# Patient Record
Sex: FEMALE | Race: OTHER | HISPANIC OR LATINO | URBAN - METROPOLITAN AREA
[De-identification: names, ages, dates, MRNs, and addresses within clinical notes are randomized per-mention and may not be internally consistent; named-entity substitution may affect disease eponyms.]

---

## 2018-11-17 ENCOUNTER — EMERGENCY (EMERGENCY)
Facility: HOSPITAL | Age: 63
LOS: 1 days | Discharge: HOME | End: 2018-11-17
Attending: EMERGENCY MEDICINE
Payer: COMMERCIAL

## 2018-11-17 VITALS
SYSTOLIC BLOOD PRESSURE: 138 MMHG | RESPIRATION RATE: 19 BRPM | DIASTOLIC BLOOD PRESSURE: 95 MMHG | TEMPERATURE: 98 F | HEIGHT: 64 IN | WEIGHT: 147.93 LBS | HEART RATE: 98 BPM | OXYGEN SATURATION: 99 %

## 2018-11-17 VITALS
RESPIRATION RATE: 18 BRPM | OXYGEN SATURATION: 99 % | HEART RATE: 78 BPM | SYSTOLIC BLOOD PRESSURE: 122 MMHG | TEMPERATURE: 98 F | DIASTOLIC BLOOD PRESSURE: 85 MMHG

## 2018-11-17 DIAGNOSIS — Z98.890 OTHER SPECIFIED POSTPROCEDURAL STATES: Chronic | ICD-10-CM

## 2018-11-17 PROCEDURE — 72131 CT LUMBAR SPINE W/O DYE: CPT | Mod: 26

## 2018-11-17 PROCEDURE — 70450 CT HEAD/BRAIN W/O DYE: CPT

## 2018-11-17 PROCEDURE — 73110 X-RAY EXAM OF WRIST: CPT

## 2018-11-17 PROCEDURE — 29125 APPL SHORT ARM SPLINT STATIC: CPT | Mod: LT

## 2018-11-17 PROCEDURE — 73100 X-RAY EXAM OF WRIST: CPT

## 2018-11-17 PROCEDURE — 99284 EMERGENCY DEPT VISIT MOD MDM: CPT | Mod: 25

## 2018-11-17 PROCEDURE — 96372 THER/PROPH/DIAG INJ SC/IM: CPT | Mod: XU

## 2018-11-17 PROCEDURE — 99284 EMERGENCY DEPT VISIT MOD MDM: CPT

## 2018-11-17 PROCEDURE — 72131 CT LUMBAR SPINE W/O DYE: CPT

## 2018-11-17 PROCEDURE — 72100 X-RAY EXAM L-S SPINE 2/3 VWS: CPT

## 2018-11-17 PROCEDURE — 73100 X-RAY EXAM OF WRIST: CPT | Mod: 26,59,LT

## 2018-11-17 PROCEDURE — 93005 ELECTROCARDIOGRAM TRACING: CPT

## 2018-11-17 PROCEDURE — 73110 X-RAY EXAM OF WRIST: CPT | Mod: 26,LT

## 2018-11-17 PROCEDURE — 72125 CT NECK SPINE W/O DYE: CPT | Mod: 26

## 2018-11-17 PROCEDURE — 72125 CT NECK SPINE W/O DYE: CPT

## 2018-11-17 PROCEDURE — 70450 CT HEAD/BRAIN W/O DYE: CPT | Mod: 26

## 2018-11-17 PROCEDURE — 72100 X-RAY EXAM L-S SPINE 2/3 VWS: CPT | Mod: 26

## 2018-11-17 RX ORDER — LIDOCAINE 4 G/100G
1 CREAM TOPICAL ONCE
Qty: 0 | Refills: 0 | Status: COMPLETED | OUTPATIENT
Start: 2018-11-17 | End: 2018-11-17

## 2018-11-17 RX ORDER — CYCLOBENZAPRINE HYDROCHLORIDE 10 MG/1
10 TABLET, FILM COATED ORAL ONCE
Qty: 0 | Refills: 0 | Status: COMPLETED | OUTPATIENT
Start: 2018-11-17 | End: 2018-11-17

## 2018-11-17 RX ORDER — ONDANSETRON 8 MG/1
4 TABLET, FILM COATED ORAL ONCE
Qty: 0 | Refills: 0 | Status: COMPLETED | OUTPATIENT
Start: 2018-11-17 | End: 2018-11-17

## 2018-11-17 RX ORDER — KETOROLAC TROMETHAMINE 30 MG/ML
30 SYRINGE (ML) INJECTION ONCE
Qty: 0 | Refills: 0 | Status: DISCONTINUED | OUTPATIENT
Start: 2018-11-17 | End: 2018-11-17

## 2018-11-17 RX ORDER — SODIUM CHLORIDE 9 MG/ML
1000 INJECTION INTRAMUSCULAR; INTRAVENOUS; SUBCUTANEOUS ONCE
Qty: 0 | Refills: 0 | Status: COMPLETED | OUTPATIENT
Start: 2018-11-17 | End: 2018-11-17

## 2018-11-17 RX ADMIN — LIDOCAINE 1 PATCH: 4 CREAM TOPICAL at 11:38

## 2018-11-17 RX ADMIN — Medication 30 MILLIGRAM(S): at 12:31

## 2018-11-17 RX ADMIN — Medication 30 MILLIGRAM(S): at 11:38

## 2018-11-17 RX ADMIN — SODIUM CHLORIDE 1000 MILLILITER(S): 9 INJECTION INTRAMUSCULAR; INTRAVENOUS; SUBCUTANEOUS at 12:32

## 2018-11-17 RX ADMIN — ONDANSETRON 4 MILLIGRAM(S): 8 TABLET, FILM COATED ORAL at 11:38

## 2018-11-17 RX ADMIN — CYCLOBENZAPRINE HYDROCHLORIDE 10 MILLIGRAM(S): 10 TABLET, FILM COATED ORAL at 12:31

## 2018-11-17 RX ADMIN — SODIUM CHLORIDE 1000 MILLILITER(S): 9 INJECTION INTRAMUSCULAR; INTRAVENOUS; SUBCUTANEOUS at 13:35

## 2018-11-17 NOTE — CONSULT NOTE ADULT - SUBJECTIVE AND OBJECTIVE BOX
Patient is a 63F RHD community ambulator without assistive devices who presents to the ED today for a c/o of L wrist pain and lower back pain. 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 She states she placed her L wrist outwards to break her fall and then landed on her back. Denies HH/LOC. States she was able to ambulate after the injury but with a lot of pain. Denies any saddle anesthesia or loss of bowel or bladder function. Denies having any other pain elsewhere. Admits to L Shoulder dislocation about 12 years ago that was closed reduced at [facility]. Denies any other orthopedic history. No other orthopedic concerns at this time.     PAST MEDICAL & SURGICAL HISTORY:  History of stroke: at 15 years old  History of shoulder surgery    Home Medications:  Denies    Allergies    No Known Allergies    Intolerances    PHYSICAL EXAM:  Vital Signs Last 24 Hrs  T(C): 36.7 (17 Nov 2018 11:16), Max: 36.7 (17 Nov 2018 11:16)  T(F): 98 (17 Nov 2018 11:16), Max: 98 (17 Nov 2018 11:16)  HR: 98 (17 Nov 2018 11:16) (98 - 98)  BP: 138/95 (17 Nov 2018 11:16) (138/95 - 138/95)  RR: 19 (17 Nov 2018 11:16) (19 - 19)  SpO2: 99% (17 Nov 2018 11:16) (99% - 99%)    LUE:  No gross deformity noted  Skin intact; No erythema or ecchymosis  SILT C5-T1  +AIN/PIN/Median/Ulnar/Radial Nerve  +ttp over radial styloid   No snuffbox tenderness  +2/4 Radial pulse  Compartments soft and compressible    Spine:         +ttp b/l paraspinal musculature         +ttp midline lumbar spine L1-2         Sensation:  intact to light touch           Motor exam:          Bilateral Upper extremities    Delt      Bicep      Tri      Wrist ext  Wrst Flex       Digit Ext Digit Flex                                                   R         5/5        5/5        5/5       5/5            5/5            5/5       5/5          5/5                                                   L          5/5        5/5        5/5       5/5            5/5            5/5       5/5          5/5         Bilateral Lower ext.     Hip Flx  Hip Ext    Quad   Hamstrg   TA       EHL      GS                                          R        5/5        5/5        5/5        5/5          5/5     5/5      5/5                                          L         5/5 5/5 5/5 5/5 5/5 5/5 5/5    Babinski:   Ankle Clonus:  Hoffmans:     Secondary Survey:  RLE/LLE/RUE: No TTP over bony prominences, SILT, palpable pulses, full/painless range of motion, compartments soft Patient is a 63F RHD community ambulator without assistive devices who presents to the ED today for a c/o of L wrist pain and lower back pain. 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 She states she placed her L wrist outwards to break her fall and then landed on her back. Denies HH/LOC. States she was able to ambulate after the injury but with a lot of pain. Denies any saddle anesthesia or loss of bowel or bladder function. Denies having any other pain elsewhere. Admits to L Shoulder dislocation about 12 years ago that was closed reduced at [facility]. Denies any other orthopedic history. No other orthopedic concerns at this time.     PAST MEDICAL & SURGICAL HISTORY:  History of stroke: at 15 years old  History of shoulder surgery    Home Medications:  Denies    Allergies    No Known Allergies    Intolerances    PHYSICAL EXAM:  Vital Signs Last 24 Hrs  T(C): 36.7 (17 Nov 2018 11:16), Max: 36.7 (17 Nov 2018 11:16)  T(F): 98 (17 Nov 2018 11:16), Max: 98 (17 Nov 2018 11:16)  HR: 98 (17 Nov 2018 11:16) (98 - 98)  BP: 138/95 (17 Nov 2018 11:16) (138/95 - 138/95)  RR: 19 (17 Nov 2018 11:16) (19 - 19)  SpO2: 99% (17 Nov 2018 11:16) (99% - 99%)    LUE:  No gross deformity noted  Skin intact; No erythema or ecchymosis  SILT C5-T1  +AIN/PIN/Median/Ulnar/Radial Nerve  +ttp over radial styloid   + snuffbox tenderness  +2/4 Radial pulse  Compartments soft and compressible    Spine:         +ttp b/l paraspinal musculature         +ttp midline lumbar spine L1-2         Sensation:  intact to light touch           Motor exam:          Bilateral Upper extremities    	Delt      Bicep      Tri      Wrist ext  Wrst Flex       Digit Ext Digit Flex                                                   R         5/5        5/5        5/5       5/5            5/5            5/5       5/5          5/5                                                   L          5/5        5/5       5/5        DONN 2/2 to Pain           5/5       5/5          5/5         Bilateral Lower ext.     Hip Flx  Hip Ext    Quad   Hamstrg   TA       EHL      GS                                          R        5/5        5/5        5/5        5/5          5/5     5/5      5/5                                          L         5/5 5/5 5/5 5/5 5/5 5/5 5/5    Babinski: Neg  Ankle Clonus: Neg  Hoffmans: Neg    Secondary Survey:  RLE/LLE/RUE: No TTP over bony prominences, SILT, palpable pulses, full/painless range of motion, compartments soft Patient is a 63F RHD community ambulator without assistive devices who presents to the ED today for a c/o of L wrist pain and lower back pain. 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 She states she placed her L wrist outwards to break her fall and then landed on her back. Denies HH/LOC. States she was able to ambulate after the injury but with a lot of pain. Denies any saddle anesthesia or loss of bowel or bladder function. Denies having any other pain elsewhere. Admits to L Shoulder dislocation about 12 years ago that was closed reduced at [facility]. Denies any other orthopedic history. No other orthopedic concerns at this time.     PAST MEDICAL & SURGICAL HISTORY:  History of stroke: at 15 years old  History of shoulder surgery    Home Medications:  Denies    Allergies    No Known Allergies    Intolerances    PHYSICAL EXAM:  Vital Signs Last 24 Hrs  T(C): 36.7 (17 Nov 2018 11:16), Max: 36.7 (17 Nov 2018 11:16)  T(F): 98 (17 Nov 2018 11:16), Max: 98 (17 Nov 2018 11:16)  HR: 98 (17 Nov 2018 11:16) (98 - 98)  BP: 138/95 (17 Nov 2018 11:16) (138/95 - 138/95)  RR: 19 (17 Nov 2018 11:16) (19 - 19)  SpO2: 99% (17 Nov 2018 11:16) (99% - 99%)    LUE:  No gross deformity noted  Skin intact; No erythema or ecchymosis  SILT C5-T1  +AIN/PIN/Median/Ulnar/Radial Nerve  +ttp over radial styloid   + snuffbox tenderness  +2/4 Radial pulse  Compartments soft and compressible    Spine:         +ttp b/l paraspinal musculature         +ttp midline lumbar spine L1-2         Sensation:  intact to light touch           Motor exam:          Bilateral Upper extremities    	Delt      Bicep      Tri      Wrist ext  Wrst Flex       Digit Ext Digit Flex                                                   R         5/5        5/5        5/5       5/5            5/5            5/5       5/5          5/5                                                   L          5/5        5/5       5/5        DONN 2/2 to Pain           5/5       5/5          5/5         Bilateral Lower ext.     Hip Flx  Hip Ext    Quad   Hamstrg   TA       EHL      GS                                          R        5/5        5/5        5/5        5/5          5/5     5/5      5/5                                          L         5/5        5/5        5/5        5/5          5/5     5/5      5/5    Babinski: Neg  Ankle Clonus: Neg  Hoffmans: Neg    Secondary Survey:  RLE/LLE/RUE: No TTP over bony prominences, SILT, palpable pulses, full/painless range of motion, compartments soft     Procedure: Patient was placed into a sugartong/thumb spica splint ensuring all bony prominences were well padded. Post reduction xrays were obtained showed adequate anatomic alignment. Patient was NVI preprocedure and postprocedure. Patient tolerated procedure well without any complications.

## 2018-11-17 NOTE — ED PROVIDER NOTE - CARE PLAN
Principal Discharge DX:	Wrist pain, left  Secondary Diagnosis:	Back pain  Secondary Diagnosis:	Fall, initial encounter

## 2018-11-17 NOTE — ED PROVIDER NOTE - PROGRESS NOTE DETAILS
spoke with [facility] resident, case discussed, will see patient ortho resident in to see patient, requested dose of flexeril for patient

## 2018-11-17 NOTE — ED PROVIDER NOTE - ATTENDING CONTRIBUTION TO CARE
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 Exam revealed white female in mild distress with tenderness to palpation dorsum left wrist with closed deformity but intact N/V LUE. Also mild tenderness to palpation lower lumbar area. I agree with plan and management outlined by PA.

## 2018-11-17 NOTE — CONSULT NOTE ADULT - ASSESSMENT
A/P: 63F s/p MF w/ L Radial Styloid Fx and L1 VCF  Analgesia  Muscle relaxant  WBAT  L wrist placed into sugartong splint  Keep in sling for comfort  Recommend TLSO brace  PT/OT  FU outpatient with Dr. Jeffers within 1 week  No acute orthopedic surgical intervention indicated at this time  Ortho stable  Will discuss with attending and advise if plan changes A/P: 63F s/p MF w/ L Radial Styloid Fx and L1 VCF  Analgesia  Muscle relaxant  WBAT B/L Lower extremities  NWB LUE  L wrist placed into sugartong splint w/ thumb spica  Keep in sling for comfort  Recommend TLSO brace  PT/OT  FU outpatient with Dr. Jeffers within 1 week  Repeat imaging of wrist needed in 1 week to r/o occult scaphoid fx given snuff box tenderness; MR may be needed if repeat xrays are negative and patient continues to have pain  No acute orthopedic surgical intervention indicated at this time  Ortho stable  Will discuss with attending and advise if plan changes

## 2018-11-17 NOTE — ED PROVIDER NOTE - OBJECTIVE STATEMENT
63 y female presents with s/p slip and fall at work today states she fell off back of truck , fell backwards, tried to stop her fall with her left arm, injured her left wrist, denies head injury, states she thinks she had episode of loc for a "second",  has lower back pain, feels nausea, and dizzy.  states at 15 yo, she was might have had a "stroke", no residual weakness, no episodes since 15 yo.  denies any other pmh, no meds,  PMD at [facility].  nonsmoker, no etoh.    no chest pain, no sob, no abdominal pain. 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  states at 15 yo, she was might have had a "stroke", no residual weakness, no episodes since 15 yo.  denies any other pmh, no meds,  PMD at [facility].  nonsmoker, no etoh.    no chest pain, no sob, no abdominal pain.

## 2018-11-17 NOTE — ED ADULT NURSE NOTE - OBJECTIVE STATEMENT
Pt. received alert and oriented x4 with chief complaint of "losing balance" while on a truck at work and falling off hitting lower back, left wrist and back of head. Pt. states she "blacked out" and continues to have dizziness and nausea.

## 2018-12-20 ENCOUNTER — OUTPATIENT (OUTPATIENT)
Dept: OUTPATIENT SERVICES | Facility: HOSPITAL | Age: 63
LOS: 1 days | Discharge: HOME | End: 2018-12-20
Payer: OTHER MISCELLANEOUS

## 2018-12-20 VITALS
RESPIRATION RATE: 20 BRPM | HEIGHT: 64 IN | OXYGEN SATURATION: 99 % | DIASTOLIC BLOOD PRESSURE: 84 MMHG | SYSTOLIC BLOOD PRESSURE: 145 MMHG | WEIGHT: 154.1 LBS | HEART RATE: 74 BPM | TEMPERATURE: 98 F

## 2018-12-20 DIAGNOSIS — Z98.890 OTHER SPECIFIED POSTPROCEDURAL STATES: Chronic | ICD-10-CM

## 2018-12-20 DIAGNOSIS — M54.5 LOW BACK PAIN: ICD-10-CM

## 2018-12-20 DIAGNOSIS — Z01.818 ENCOUNTER FOR OTHER PREPROCEDURAL EXAMINATION: ICD-10-CM

## 2018-12-20 LAB
ABO RH CONFIRMATION: SIGNIFICANT CHANGE UP
ANION GAP SERPL CALC-SCNC: 7 MMOL/L — SIGNIFICANT CHANGE UP (ref 5–17)
APPEARANCE UR: CLEAR — SIGNIFICANT CHANGE UP
APTT BLD: 32.8 SEC — SIGNIFICANT CHANGE UP (ref 27.5–36.3)
BACTERIA # UR AUTO: ABNORMAL
BASOPHILS # BLD AUTO: 0.03 K/UL — SIGNIFICANT CHANGE UP (ref 0–0.2)
BASOPHILS NFR BLD AUTO: 0.5 % — SIGNIFICANT CHANGE UP (ref 0–2)
BILIRUB UR-MCNC: NEGATIVE — SIGNIFICANT CHANGE UP
BLD GP AB SCN SERPL QL: SIGNIFICANT CHANGE UP
BUN SERPL-MCNC: 19 MG/DL — SIGNIFICANT CHANGE UP (ref 7–23)
CALCIUM SERPL-MCNC: 10.1 MG/DL — SIGNIFICANT CHANGE UP (ref 8.5–10.1)
CHLORIDE SERPL-SCNC: 104 MMOL/L — SIGNIFICANT CHANGE UP (ref 96–108)
CO2 SERPL-SCNC: 28 MMOL/L — SIGNIFICANT CHANGE UP (ref 22–31)
COLOR SPEC: YELLOW — SIGNIFICANT CHANGE UP
CREAT SERPL-MCNC: 0.91 MG/DL — SIGNIFICANT CHANGE UP (ref 0.5–1.3)
DIFF PNL FLD: NEGATIVE — SIGNIFICANT CHANGE UP
EOSINOPHIL # BLD AUTO: 0.09 K/UL — SIGNIFICANT CHANGE UP (ref 0–0.5)
EOSINOPHIL NFR BLD AUTO: 1.6 % — SIGNIFICANT CHANGE UP (ref 0–6)
EPI CELLS # UR: NEGATIVE — SIGNIFICANT CHANGE UP
GLUCOSE SERPL-MCNC: 89 MG/DL — SIGNIFICANT CHANGE UP (ref 70–99)
GLUCOSE UR QL: NEGATIVE MG/DL — SIGNIFICANT CHANGE UP
HCT VFR BLD CALC: 45 % — SIGNIFICANT CHANGE UP (ref 34.5–45)
HGB BLD-MCNC: 14.6 G/DL — SIGNIFICANT CHANGE UP (ref 11.5–15.5)
IMM GRANULOCYTES NFR BLD AUTO: 0.2 % — SIGNIFICANT CHANGE UP (ref 0–1.5)
INR BLD: 1.04 RATIO — SIGNIFICANT CHANGE UP (ref 0.88–1.16)
KETONES UR-MCNC: NEGATIVE — SIGNIFICANT CHANGE UP
LEUKOCYTE ESTERASE UR-ACNC: ABNORMAL
LYMPHOCYTES # BLD AUTO: 1.84 K/UL — SIGNIFICANT CHANGE UP (ref 1–3.3)
LYMPHOCYTES # BLD AUTO: 33.3 % — SIGNIFICANT CHANGE UP (ref 13–44)
MCHC RBC-ENTMCNC: 28 PG — SIGNIFICANT CHANGE UP (ref 27–34)
MCHC RBC-ENTMCNC: 32.4 GM/DL — SIGNIFICANT CHANGE UP (ref 32–36)
MCV RBC AUTO: 86.4 FL — SIGNIFICANT CHANGE UP (ref 80–100)
MONOCYTES # BLD AUTO: 0.3 K/UL — SIGNIFICANT CHANGE UP (ref 0–0.9)
MONOCYTES NFR BLD AUTO: 5.4 % — SIGNIFICANT CHANGE UP (ref 2–14)
NEUTROPHILS # BLD AUTO: 3.25 K/UL — SIGNIFICANT CHANGE UP (ref 1.8–7.4)
NEUTROPHILS NFR BLD AUTO: 59 % — SIGNIFICANT CHANGE UP (ref 43–77)
NITRITE UR-MCNC: NEGATIVE — SIGNIFICANT CHANGE UP
NRBC # BLD: 0 /100 WBCS — SIGNIFICANT CHANGE UP (ref 0–0)
PH UR: 5 — SIGNIFICANT CHANGE UP (ref 5–8)
PLATELET # BLD AUTO: 313 K/UL — SIGNIFICANT CHANGE UP (ref 150–400)
POTASSIUM SERPL-MCNC: 4.2 MMOL/L — SIGNIFICANT CHANGE UP (ref 3.5–5.3)
POTASSIUM SERPL-SCNC: 4.2 MMOL/L — SIGNIFICANT CHANGE UP (ref 3.5–5.3)
PROT UR-MCNC: NEGATIVE MG/DL — SIGNIFICANT CHANGE UP
PROTHROM AB SERPL-ACNC: 11.6 SEC — SIGNIFICANT CHANGE UP (ref 10–12.9)
RBC # BLD: 5.21 M/UL — HIGH (ref 3.8–5.2)
RBC # FLD: 13.2 % — SIGNIFICANT CHANGE UP (ref 10.3–14.5)
RBC CASTS # UR COMP ASSIST: NEGATIVE /HPF — SIGNIFICANT CHANGE UP (ref 0–4)
SODIUM SERPL-SCNC: 139 MMOL/L — SIGNIFICANT CHANGE UP (ref 135–145)
SP GR SPEC: 1.01 — SIGNIFICANT CHANGE UP (ref 1.01–1.02)
TYPE + AB SCN PNL BLD: SIGNIFICANT CHANGE UP
UROBILINOGEN FLD QL: NEGATIVE MG/DL — SIGNIFICANT CHANGE UP
WBC # BLD: 5.52 K/UL — SIGNIFICANT CHANGE UP (ref 3.8–10.5)
WBC # FLD AUTO: 5.52 K/UL — SIGNIFICANT CHANGE UP (ref 3.8–10.5)
WBC UR QL: SIGNIFICANT CHANGE UP

## 2018-12-20 PROCEDURE — 71046 X-RAY EXAM CHEST 2 VIEWS: CPT | Mod: 26

## 2018-12-20 NOTE — H&P PST ADULT - ASSESSMENT
64 y/o female with spinal lumbar compression fracture. Scheduled for kyphoplasty.     Plan  1. Stop all NSAIDS, herbal supplements and vitamins for 7 days.  2. NPO at midnight.  3. Use EZ sponges as directed  4. Use mupirocin as directed  6. Labs done today. EKG, CXR on chart  7. Abnormal EKG noted. PMD visit for EKG comparison prior to surgery as per surgeon. 64 y/o female with spinal lumbar compression fracture. Scheduled for kyphoplasty.     Plan  1. Stop all NSAIDS, herbal supplements and vitamins for 7 days.  2. NPO at midnight.  3. Use EZ sponges as directed  4. Use mupirocin as directed  6. Labs, CXR done today. EKG on chart  7. Abnormal EKG noted. PMD visit for EKG comparison prior to surgery.

## 2018-12-20 NOTE — H&P PST ADULT - NSANTHOSAYNRD_GEN_A_CORE
No. SHABBIR screening performed.  STOP BANG Legend: 0-2 = LOW Risk; 3-4 = INTERMEDIATE Risk; 5-8 = HIGH Risk

## 2018-12-20 NOTE — H&P PST ADULT - HISTORY OF PRESENT ILLNESS
62 y/o female with spinal lumbar compression fracture, left radial styloid fracture. Pt reports she had an accident at work on November 17, 2018. Pt was taken to ER in [facility], placed on body brace. Pt poor historian. Complain of back pain since the accident, 8/10. Takes Tylenol with mild pain relief. Pt has difficulty walking and doing anything due to back pain. Scheduled for kyphoplasty. 62 y/o female with spinal lumbar compression fracture, left radial styloid fracture. Pt reports she had an accident at work on November 17, 2018. Pt was taken to ER in [facility], placed on TLSO brace. Pt poor historian. Complain of back pain since the accident, 8/10. Takes Tylenol with mild pain relief. Pt has difficulty doing anything due to back pain. Scheduled for kyphoplasty.

## 2018-12-20 NOTE — H&P PST ADULT - PMH
Compression fracture of lumbar vertebra    History of stroke  at 15 years old  Radial styloid fracture  left

## 2018-12-20 NOTE — H&P PST ADULT - PSH
History of bladder surgery  bladder lift 2000  History of shoulder surgery  left rotator cuff repair 2005

## 2018-12-21 LAB
MRSA PCR RESULT.: SIGNIFICANT CHANGE UP
S AUREUS DNA NOSE QL NAA+PROBE: DETECTED

## 2018-12-28 PROBLEM — S52.513A: Chronic | Status: ACTIVE | Noted: 2018-12-20

## 2018-12-28 PROBLEM — S32.000A WEDGE COMPRESSION FRACTURE OF UNSPECIFIED LUMBAR VERTEBRA, INITIAL ENCOUNTER FOR CLOSED FRACTURE: Chronic | Status: ACTIVE | Noted: 2018-12-20

## 2019-01-08 ENCOUNTER — APPOINTMENT (OUTPATIENT)
Dept: CARDIOLOGY | Facility: CLINIC | Age: 64
End: 2019-01-08

## 2019-03-15 ENCOUNTER — OUTPATIENT (OUTPATIENT)
Dept: OUTPATIENT SERVICES | Facility: HOSPITAL | Age: 64
LOS: 1 days | Discharge: HOME | End: 2019-03-15

## 2019-03-15 VITALS
SYSTOLIC BLOOD PRESSURE: 127 MMHG | DIASTOLIC BLOOD PRESSURE: 79 MMHG | TEMPERATURE: 100 F | OXYGEN SATURATION: 97 % | WEIGHT: 162.48 LBS | RESPIRATION RATE: 16 BRPM | HEART RATE: 82 BPM | HEIGHT: 64 IN

## 2019-03-15 DIAGNOSIS — Z01.818 ENCOUNTER FOR OTHER PREPROCEDURAL EXAMINATION: ICD-10-CM

## 2019-03-15 DIAGNOSIS — S32.010A WEDGE COMPRESSION FRACTURE OF FIRST LUMBAR VERTEBRA, INITIAL ENCOUNTER FOR CLOSED FRACTURE: ICD-10-CM

## 2019-03-15 DIAGNOSIS — Z98.890 OTHER SPECIFIED POSTPROCEDURAL STATES: Chronic | ICD-10-CM

## 2019-03-15 DIAGNOSIS — Z01.812 ENCOUNTER FOR PREPROCEDURAL LABORATORY EXAMINATION: ICD-10-CM

## 2019-03-15 DIAGNOSIS — S32.010D WEDGE COMPRESSION FRACTURE OF FIRST LUMBAR VERTEBRA, SUBSEQUENT ENCOUNTER FOR FRACTURE WITH ROUTINE HEALING: ICD-10-CM

## 2019-03-15 LAB
ANION GAP SERPL CALC-SCNC: 9 MMOL/L — SIGNIFICANT CHANGE UP (ref 5–17)
APTT BLD: 32.8 SEC — SIGNIFICANT CHANGE UP (ref 27.5–36.3)
BUN SERPL-MCNC: 16 MG/DL — SIGNIFICANT CHANGE UP (ref 7–23)
CALCIUM SERPL-MCNC: 9 MG/DL — SIGNIFICANT CHANGE UP (ref 8.5–10.1)
CHLORIDE SERPL-SCNC: 105 MMOL/L — SIGNIFICANT CHANGE UP (ref 96–108)
CO2 SERPL-SCNC: 26 MMOL/L — SIGNIFICANT CHANGE UP (ref 22–31)
CREAT SERPL-MCNC: 0.78 MG/DL — SIGNIFICANT CHANGE UP (ref 0.5–1.3)
GLUCOSE SERPL-MCNC: 85 MG/DL — SIGNIFICANT CHANGE UP (ref 70–99)
HCT VFR BLD CALC: 43.4 % — SIGNIFICANT CHANGE UP (ref 34.5–45)
HGB BLD-MCNC: 14 G/DL — SIGNIFICANT CHANGE UP (ref 11.5–15.5)
INR BLD: 1.01 RATIO — SIGNIFICANT CHANGE UP (ref 0.88–1.16)
MCHC RBC-ENTMCNC: 27.9 PG — SIGNIFICANT CHANGE UP (ref 27–34)
MCHC RBC-ENTMCNC: 32.3 GM/DL — SIGNIFICANT CHANGE UP (ref 32–36)
MCV RBC AUTO: 86.5 FL — SIGNIFICANT CHANGE UP (ref 80–100)
NRBC # BLD: 0 /100 WBCS — SIGNIFICANT CHANGE UP (ref 0–0)
PLATELET # BLD AUTO: 326 K/UL — SIGNIFICANT CHANGE UP (ref 150–400)
POTASSIUM SERPL-MCNC: 4.3 MMOL/L — SIGNIFICANT CHANGE UP (ref 3.5–5.3)
POTASSIUM SERPL-SCNC: 4.3 MMOL/L — SIGNIFICANT CHANGE UP (ref 3.5–5.3)
PROTHROM AB SERPL-ACNC: 11.3 SEC — SIGNIFICANT CHANGE UP (ref 10–12.9)
RBC # BLD: 5.02 M/UL — SIGNIFICANT CHANGE UP (ref 3.8–5.2)
RBC # FLD: 13.8 % — SIGNIFICANT CHANGE UP (ref 10.3–14.5)
SODIUM SERPL-SCNC: 140 MMOL/L — SIGNIFICANT CHANGE UP (ref 135–145)
WBC # BLD: 5.19 K/UL — SIGNIFICANT CHANGE UP (ref 3.8–10.5)
WBC # FLD AUTO: 5.19 K/UL — SIGNIFICANT CHANGE UP (ref 3.8–10.5)

## 2019-03-15 NOTE — H&P PST ADULT - NSICDXPASTMEDICALHX_GEN_ALL_CORE_FT
PAST MEDICAL HISTORY:  Compression fracture of lumbar vertebra     History of stroke at 15 years old, ?tia, no focal deficits walks with cane since the fall. Otherwise did not need assistance.    Radial styloid fracture left

## 2019-03-15 NOTE — H&P PST ADULT - HISTORY OF PRESENT ILLNESS
62 y/o healthy female with c/o of injury on the back and left arm from a fall at work. 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 Had tests done for the back , was told to have fracture of the L1. Needs kyphoplasty. Scheduled for 3/19/19. Also had a fracture of te left lower arm wrist area, now with dsg and splint. Following up with orthopedist for that. Takes Tylenol for pain. pre op testing today.

## 2019-03-15 NOTE — H&P PST ADULT - RS GEN PE MLT RESP DETAILS PC
clear to auscultation bilaterally/airway patent/breath sounds equal/good air movement/no chest wall tenderness/respirations non-labored

## 2019-03-15 NOTE — H&P PST ADULT - NSICDXPASTSURGICALHX_GEN_ALL_CORE_FT
PAST SURGICAL HISTORY:  History of bladder surgery bladder lift 2000    History of shoulder surgery left rotator cuff repair 2005

## 2019-03-15 NOTE — H&P PST ADULT - NSICDXPROBLEM_GEN_ALL_CORE_FT
PROBLEM DIAGNOSES  Problem: Wedge compression fracture of L1 vertebra  Assessment and Plan: L1 Kyphoplasty

## 2019-03-15 NOTE — H&P PST ADULT - ASSESSMENT
64 y/o healthy female with c/o of injury on the back and left arm from a fall at work. 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 Had tests done for the back , was told to have fracture of the L1. Needs kyphoplasty. Scheduled for 3/19/19. Also had a fracture of te left lower arm wrist area, now with dsg and splint. Following up with orthopedist for that. Takes Tylenol for pain. pre op testing today.   Medical eval not needed . EKG: NSR. CBC wnl. other abs pending results.  CAPRINI SCORE [CLOT]    AGE RELATED RISK FACTORS                                                       MOBILITY RELATED FACTORS  [ ] Age 41-60 years                                            (1 Point)                  [ ] Bed rest                                                        (1 Point)  [x ] Age: 61-74 years                                           (2 Points)                 [ ] Plaster cast                                                   (2 Points)  [ ] Age= 75 years                                              (3 Points)                 [ ] Bed bound for more than 72 hours                 (2 Points)    DISEASE RELATED RISK FACTORS                                               GENDER SPECIFIC FACTORS  [ ] Edema in the lower extremities                       (1 Point)                  [ ] Pregnancy                                                     (1 Point)  [ ] Varicose veins                                               (1 Point)                  [ ] Post-partum < 6 weeks                                   (1 Point)             [ ] BMI > 25 Kg/m2                                            (1 Point)                  [ ] Hormonal therapy  or oral contraception          (1 Point)                 [ ] Sepsis (in the previous month)                        (1 Point)                  [ ] History of pregnancy complications                 (1 point)  [ ] Pneumonia or serious lung disease                                               [ ] Unexplained or recurrent                     (1 Point)           (in the previous month)                               (1 Point)  [ ] Abnormal pulmonary function test                     (1 Point)                 SURGERY RELATED RISK FACTORS  [ ] Acute myocardial infarction                              (1 Point)                 [ ]  Section                                             (1 Point)  [ ] Congestive heart failure (in the previous month)  (1 Point)               [ ] Minor surgery                                                  (1 Point)   [ ] Inflammatory bowel disease                             (1 Point)                 [ ] Arthroscopic surgery                                        (2 Points)  [ ] Central venous access                                      (2 Points)                [x ] General surgery lasting more than 45 minutes   (2 Points)       [ ] Stroke (in the previous month)                          (5 Points)               [ ] Elective arthroplasty                                         (5 Points)                                                                                                                                               HEMATOLOGY RELATED FACTORS                                                 TRAUMA RELATED RISK FACTORS  [ ] Prior episodes of VTE                                     (3 Points)                [ ] Fracture of the hip, pelvis, or leg                       (5 Points)  [ ] Positive family history for VTE                         (3 Points)                 [ ] Acute spinal cord injury (in the previous month)  (5 Points)  [ ] Prothrombin  A                                     (3 Points)                 [ ] Paralysis  (less than 1 month)                             (5 Points)  [ ] Factor V Leiden                                             (3 Points)                  [ ] Multiple Trauma within 1 month                        (5 Points)  [ ] Lupus anticoagulants                                     (3 Points)                                                           [ ] Anticardiolipin antibodies                               (3 Points)                                                       [ ] High homocysteine in the blood                      (3 Points)                                             [ ] Other congenital or acquired thrombophilia      (3 Points)                                                [ ] Heparin induced thrombocytopenia                  (3 Points)                                          Total Score [      4    ]

## 2019-03-15 NOTE — H&P PST ADULT - NEUROLOGICAL COMMENTS
noted PMH of stroke at age 15, no residual weakness, possible TIA. no evnts since then until fall last yr from a machine/

## 2019-03-16 PROBLEM — Z86.73 PERSONAL HISTORY OF TRANSIENT ISCHEMIC ATTACK (TIA), AND CEREBRAL INFARCTION WITHOUT RESIDUAL DEFICITS: Chronic | Status: ACTIVE | Noted: 2018-11-17

## 2019-03-19 ENCOUNTER — OUTPATIENT (OUTPATIENT)
Dept: OUTPATIENT SERVICES | Facility: HOSPITAL | Age: 64
LOS: 1 days | Discharge: HOME | End: 2019-03-19
Payer: OTHER MISCELLANEOUS

## 2019-03-19 ENCOUNTER — RESULT REVIEW (OUTPATIENT)
Age: 64
End: 2019-03-19

## 2019-03-19 VITALS
RESPIRATION RATE: 14 BRPM | OXYGEN SATURATION: 98 % | DIASTOLIC BLOOD PRESSURE: 76 MMHG | SYSTOLIC BLOOD PRESSURE: 132 MMHG | HEART RATE: 61 BPM

## 2019-03-19 VITALS
TEMPERATURE: 99 F | HEIGHT: 64 IN | OXYGEN SATURATION: 95 % | RESPIRATION RATE: 18 BRPM | DIASTOLIC BLOOD PRESSURE: 86 MMHG | WEIGHT: 162.48 LBS | SYSTOLIC BLOOD PRESSURE: 142 MMHG | HEART RATE: 78 BPM

## 2019-03-19 DIAGNOSIS — Z98.890 OTHER SPECIFIED POSTPROCEDURAL STATES: Chronic | ICD-10-CM

## 2019-03-19 PROCEDURE — 88311 DECALCIFY TISSUE: CPT | Mod: 26

## 2019-03-19 PROCEDURE — 88305 TISSUE EXAM BY PATHOLOGIST: CPT | Mod: 26

## 2019-03-19 RX ORDER — FENTANYL CITRATE 50 UG/ML
25 INJECTION INTRAVENOUS
Qty: 0 | Refills: 0 | Status: DISCONTINUED | OUTPATIENT
Start: 2019-03-19 | End: 2019-03-19

## 2019-03-19 RX ORDER — OXYCODONE HYDROCHLORIDE 5 MG/1
1 TABLET ORAL
Qty: 30 | Refills: 0 | OUTPATIENT
Start: 2019-03-19 | End: 2019-03-23

## 2019-03-19 RX ORDER — CHOLECALCIFEROL (VITAMIN D3) 125 MCG
1 CAPSULE ORAL
Qty: 0 | Refills: 0 | COMMUNITY

## 2019-03-19 RX ORDER — ONDANSETRON 8 MG/1
4 TABLET, FILM COATED ORAL ONCE
Qty: 0 | Refills: 0 | Status: COMPLETED | OUTPATIENT
Start: 2019-03-19 | End: 2019-03-19

## 2019-03-19 RX ORDER — SODIUM CHLORIDE 9 MG/ML
1000 INJECTION, SOLUTION INTRAVENOUS
Qty: 0 | Refills: 0 | Status: DISCONTINUED | OUTPATIENT
Start: 2019-03-19 | End: 2019-03-19

## 2019-03-19 RX ORDER — SODIUM CHLORIDE 9 MG/ML
3 INJECTION INTRAMUSCULAR; INTRAVENOUS; SUBCUTANEOUS EVERY 8 HOURS
Qty: 0 | Refills: 0 | Status: DISCONTINUED | OUTPATIENT
Start: 2019-03-19 | End: 2019-03-19

## 2019-03-19 RX ORDER — ACETAMINOPHEN 500 MG
1000 TABLET ORAL ONCE
Qty: 0 | Refills: 0 | Status: COMPLETED | OUTPATIENT
Start: 2019-03-19 | End: 2019-03-19

## 2019-03-19 RX ORDER — ACETAMINOPHEN 500 MG
1 TABLET ORAL
Qty: 0 | Refills: 0 | COMMUNITY

## 2019-03-19 RX ORDER — HYDROMORPHONE HYDROCHLORIDE 2 MG/ML
0.5 INJECTION INTRAMUSCULAR; INTRAVENOUS; SUBCUTANEOUS
Qty: 0 | Refills: 0 | Status: DISCONTINUED | OUTPATIENT
Start: 2019-03-19 | End: 2019-03-19

## 2019-03-19 RX ADMIN — HYDROMORPHONE HYDROCHLORIDE 0.5 MILLIGRAM(S): 2 INJECTION INTRAMUSCULAR; INTRAVENOUS; SUBCUTANEOUS at 19:22

## 2019-03-19 RX ADMIN — SODIUM CHLORIDE 100 MILLILITER(S): 9 INJECTION, SOLUTION INTRAVENOUS at 18:32

## 2019-03-19 RX ADMIN — Medication 1000 MILLIGRAM(S): at 19:17

## 2019-03-19 RX ADMIN — Medication 400 MILLIGRAM(S): at 19:02

## 2019-03-19 RX ADMIN — ONDANSETRON 4 MILLIGRAM(S): 8 TABLET, FILM COATED ORAL at 20:01

## 2019-03-19 RX ADMIN — HYDROMORPHONE HYDROCHLORIDE 0.5 MILLIGRAM(S): 2 INJECTION INTRAMUSCULAR; INTRAVENOUS; SUBCUTANEOUS at 19:10

## 2019-03-19 NOTE — ASU DISCHARGE PLAN (ADULT/PEDIATRIC) - CALL YOUR DOCTOR IF YOU HAVE ANY OF THE FOLLOWING:
Numbness, tingling, color or temperature change to extremity/Unable to urinate/Bleeding that does not stop/Wound/Surgical Site with redness, or foul smelling discharge or pus

## 2019-03-19 NOTE — PROGRESS NOTE ADULT - SUBJECTIVE AND OBJECTIVE BOX
[facility]    Operative Report    Date of Surgery: 03/19/19    Patient: Jerry Norton    Surgeon: Deborah Jeffers DO – Orthopedic Surgery    Orthopedic Resident    Medical Record Number: [id number]    Account Number: [id number]    Dictation:     Pre op Diagnosis:  L1 Compression Fracture with delayed healing and deformity    Post op Diagnosis:  Same    Procedure: Deep Bone biopsy  L1 Kyphoplasty  Flouroscope supervision    Fluoroscopy supervision and reading of xray during the case.    Anesthesia: General Endotracheal Intubation with Neuromonitoring    Positioning: Prone on zaida    Mechanical Venodyne Compression Device    Complication None:    Estimated Blood Loss: 5 ml    Procedure Summary:    Preoperative Discussion: Risk and benefits of surgery were discussed extensively with the patient.     I had extensive discussion with patient that expectation of full recovery is unrealistic fracture healing should help control her pain and improve her function.  Goal is to provide stability.    Patient with multilevel pathology with spondylolisthesis at L4-L5 level.  Goal is to address the most significant levels.  My hope is that with fracture care will improve her pain and discomfort.    Risk and benefits discussed in detail and patient agreed to proceed.  All questions answered.  Informed consent is signed.    Procedure in detail: Informed consent was obtained. Patient received general anesthesia and was placed prone on zaida frame.  Patient received preoperative antibiotics per protocol.  All extremities well padded. Fluoroscopy was used for this case.      Approximately 0.5 cm incision was made in the vertical fashion just lateral to each pedicle.  Sharp dissection to the fascia was performed.  Trocar's inserted under flouroscope guidance and did not cross the medial wall until they were beyond the posterior cortex of vertebral body.  Subsequently, bone biopsy specimen was taken as protocol to rule out any other cause of non healing fracture.  then Drill was used to make room for the kyphoplasty balloons.   Balloons were inflated bilaterally.  On the left baloon inflated at the site of fracture defect.  On the right side fracture appears to have healed with only mild elevation of bone with balloon.  Next ballons are deflated and removed.  Subsequently kyphoplasty cement when doughy consistency is injected.  2.0 ml injected on left and 1ml injected on right side.     Then trocars were removed.  No tails identified.    Finals xrays taken and read.  overall satisfied with cement placement.     Wound is copiously irrigated.    Subsequently, closed fascia with 2.0 vicryl in interrupted fascia and dermabond.     Sterile dressing was applied using eyepatch and tegaderm    Patient was subsequently extubated. Patient was moving all 4 limbs.     Estimated Blood Loss 5 ml    Deborah Jeffers  [employer]  Deborah Jeffers DO    Electric signature  for submission to medical records.

## 2019-03-19 NOTE — PROGRESS NOTE ADULT - SUBJECTIVE AND OBJECTIVE BOX
Patient with L1 compression fracture.  She has additional loss of height at fracture site and her pain has persisted even with bracing for 4 months.  Therefore, kyphoplasty decision to proceed with planned surgery.

## 2019-03-19 NOTE — ASU PATIENT PROFILE, ADULT - PMH
Compression fracture of lumbar vertebra    History of stroke  at 15 years old, ?tia, no focal deficits walks with cane since the fall. Otherwise did not need assistance.  Radial styloid fracture  left

## 2019-03-19 NOTE — ASU DISCHARGE PLAN (ADULT/PEDIATRIC) - CARE PROVIDER_API CALL
Deborah Jeffers (DO)  Orthopaedic Surgery Orthopaedics Surgery  [address], [place] [address]  Phone: [phone]  Fax: [phone]  Follow Up Time:

## 2019-03-21 LAB — SURGICAL PATHOLOGY STUDY: SIGNIFICANT CHANGE UP
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
## 2019-03-22 DIAGNOSIS — S32.019A UNSPECIFIED FRACTURE OF FIRST LUMBAR VERTEBRA, INITIAL ENCOUNTER FOR CLOSED FRACTURE: ICD-10-CM

## 2019-03-22 DIAGNOSIS — Z86.73 PERSONAL HISTORY OF TRANSIENT ISCHEMIC ATTACK (TIA), AND CEREBRAL INFARCTION WITHOUT RESIDUAL DEFICITS: ICD-10-CM
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
## 2021-03-08 ENCOUNTER — RESULT REVIEW (OUTPATIENT)
Age: 66
End: 2021-03-08

## 2021-03-08 ENCOUNTER — APPOINTMENT (OUTPATIENT)
Dept: FAMILY MEDICINE | Facility: CLINIC | Age: 66
End: 2021-03-08
Payer: MEDICARE

## 2021-03-08 VITALS
DIASTOLIC BLOOD PRESSURE: 80 MMHG | OXYGEN SATURATION: 98 % | HEIGHT: 60 IN | HEART RATE: 97 BPM | SYSTOLIC BLOOD PRESSURE: 122 MMHG | TEMPERATURE: 97.8 F | RESPIRATION RATE: 16 BRPM | BODY MASS INDEX: 33.77 KG/M2 | WEIGHT: 172 LBS

## 2021-03-08 DIAGNOSIS — Z82.49 FAMILY HISTORY OF ISCHEMIC HEART DISEASE AND OTHER DISEASES OF THE CIRCULATORY SYSTEM: ICD-10-CM

## 2021-03-08 DIAGNOSIS — J45.909 UNSPECIFIED ASTHMA, UNCOMPLICATED: ICD-10-CM

## 2021-03-08 PROCEDURE — 36415 COLL VENOUS BLD VENIPUNCTURE: CPT

## 2021-03-08 PROCEDURE — 99072 ADDL SUPL MATRL&STAF TM PHE: CPT

## 2021-03-08 PROCEDURE — G0439: CPT

## 2021-03-08 NOTE — COUNSELING
[Fall prevention counseling provided] : Fall prevention counseling provided [Behavioral health counseling provided] : Behavioral health counseling provided [Potential consequences of obesity discussed] : Potential consequences of obesity discussed [Benefits of weight loss discussed] : Benefits of weight loss discussed [Target Wt Loss Goal ___] : Weight Loss Goals: Target weight loss goal [unfilled] lbs [None] : None [Good understanding] : Patient has a good understanding of lifestyle changes and steps needed to achieve self management goal

## 2021-03-08 NOTE — PHYSICAL EXAM
[Well Nourished] : well nourished [Soft] : abdomen soft [Non Tender] : non-tender [Non-distended] : non-distended [No Masses] : no abdominal mass palpated [No HSM] : no HSM [Normal Bowel Sounds] : normal bowel sounds [Normal Posterior Cervical Nodes] : no posterior cervical lymphadenopathy [Normal Anterior Cervical Nodes] : no anterior cervical lymphadenopathy [Normal Insight/Judgement] : insight and judgment were intact [Normal] : affect was normal and insight and judgment were intact

## 2021-03-11 LAB
25(OH)D3 SERPL-MCNC: 43.1 NG/ML
ALBUMIN SERPL ELPH-MCNC: 4.6 G/DL
ALP BLD-CCNC: 155 U/L
ALT SERPL-CCNC: 39 U/L
ANION GAP SERPL CALC-SCNC: 15 MMOL/L
APPEARANCE: CLEAR
AST SERPL-CCNC: 30 U/L
BASOPHILS # BLD AUTO: 0.04 K/UL
BASOPHILS NFR BLD AUTO: 0.7 %
BILIRUB SERPL-MCNC: <0.2 MG/DL
BILIRUBIN URINE: NEGATIVE
BLOOD URINE: NEGATIVE
BUN SERPL-MCNC: 19 MG/DL
CALCIUM SERPL-MCNC: 9.8 MG/DL
CHLORIDE SERPL-SCNC: 106 MMOL/L
CHOLEST SERPL-MCNC: 252 MG/DL
CO2 SERPL-SCNC: 23 MMOL/L
COLOR: NORMAL
CREAT SERPL-MCNC: 0.82 MG/DL
EOSINOPHIL # BLD AUTO: 0.07 K/UL
EOSINOPHIL NFR BLD AUTO: 1.2 %
ESTIMATED AVERAGE GLUCOSE: 123 MG/DL
GLUCOSE QUALITATIVE U: NEGATIVE
GLUCOSE SERPL-MCNC: 98 MG/DL
HBA1C MFR BLD HPLC: 5.9 %
HCT VFR BLD CALC: 45.8 %
HCV AB SER QL: NONREACTIVE
HCV S/CO RATIO: 0.04 S/CO
HDLC SERPL-MCNC: 47 MG/DL
HGB BLD-MCNC: 14.2 G/DL
HIV1+2 AB SPEC QL IA.RAPID: NONREACTIVE
IMM GRANULOCYTES NFR BLD AUTO: 0.3 %
KETONES URINE: NEGATIVE
LDLC SERPL CALC-MCNC: 150 MG/DL
LEUKOCYTE ESTERASE URINE: NEGATIVE
LYMPHOCYTES # BLD AUTO: 1.59 K/UL
LYMPHOCYTES NFR BLD AUTO: 27.2 %
MAN DIFF?: NORMAL
MCHC RBC-ENTMCNC: 28.7 PG
MCHC RBC-ENTMCNC: 31 GM/DL
MCV RBC AUTO: 92.5 FL
MONOCYTES # BLD AUTO: 0.42 K/UL
MONOCYTES NFR BLD AUTO: 7.2 %
NEUTROPHILS # BLD AUTO: 3.71 K/UL
NEUTROPHILS NFR BLD AUTO: 63.4 %
NITRITE URINE: NEGATIVE
NONHDLC SERPL-MCNC: 205 MG/DL
PH URINE: 5.5
PLATELET # BLD AUTO: 351 K/UL
POTASSIUM SERPL-SCNC: 4.8 MMOL/L
PROT SERPL-MCNC: 7.3 G/DL
PROTEIN URINE: NEGATIVE
RBC # BLD: 4.95 M/UL
RBC # FLD: 13.3 %
SODIUM SERPL-SCNC: 144 MMOL/L
SPECIFIC GRAVITY URINE: 1.02
TRIGL SERPL-MCNC: 274 MG/DL
TSH SERPL-ACNC: 0.18 UIU/ML
UROBILINOGEN URINE: NORMAL
WBC # FLD AUTO: 5.85 K/UL

## 2021-03-22 NOTE — HEALTH RISK ASSESSMENT
[Very Good] : ~his/her~ current health as very good [Excellent] : ~his/her~  mood as  excellent [No falls in past year] : Patient reported no falls in the past year [0] : 2) Feeling down, depressed, or hopeless: Not at all (0) [Patient reported mammogram was abnormal] : Patient reported mammogram was abnormal [Patient reported PAP Smear was normal] : Patient reported PAP Smear was normal [Patient reported colonoscopy was normal] : Patient reported colonoscopy was normal [HIV Test offered] : HIV Test offered [Hepatitis C test offered] : Hepatitis C test offered [None] : None [Alone] : lives alone [Retired] : retired [Graduate School] : graduate school [] :  [# Of Children ___] : has [unfilled] children [Feels Safe at Home] : Feels safe at home [Fully functional (bathing, dressing, toileting, transferring, walking, feeding)] : Fully functional (bathing, dressing, toileting, transferring, walking, feeding) [Fully functional (using the telephone, shopping, preparing meals, housekeeping, doing laundry, using] : Fully functional and needs no help or supervision to perform IADLs (using the telephone, shopping, preparing meals, housekeeping, doing laundry, using transportation, managing medications and managing finances) [Reports changes in dental health] : Reports changes in dental health [Smoke Detector] : smoke detector [Carbon Monoxide Detector] : carbon monoxide detector [Safety elements used in home] : safety elements used in home [Seat Belt] :  uses seat belt [Sunscreen] : uses sunscreen [With Patient/Caregiver] : With Patient/Caregiver [Designated Healthcare Proxy] : Designated healthcare proxy [Name: ___] : Health Care Proxy's Name: [unfilled]  [Relationship: ___] : Relationship: [unfilled] [I will adhere to the patient's wishes as expressed in the advance directive except as noted below.] : I will adhere to the patient's wishes as expressed in the advance directive except as noted below [No] : In the past 12 months have you used drugs other than those required for medical reasons? No [On disability] : on disability [] : No [de-identified] : yoga [de-identified] : well-rounded [[id number]] : 0 [Change in mental status noted] : No change in mental status noted [Language] : denies difficulty with language [Behavior] : denies difficulty with behavior [Learning/Retaining New Information] : denies difficulty learning/retaining new information [Handling Complex Tasks] : denies difficulty handling complex tasks [Reasoning] : denies difficulty with reasoning [Spatial Ability and Orientation] : denies difficulty with spatial ability and orientation [Sexually Active] : not sexually active [High Risk Behavior] : no high risk behavior [Reports changes in hearing] : Reports no changes in hearing [Reports changes in vision] : Reports no changes in vision [Reports normal functional visual acuity (ie: able to read med bottle)] : Reports poor functional visual acuity.  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

## 2021-03-22 NOTE — PAST MEDICAL HISTORY
[Postmenopausal] : postmenopausal [Total Preg ___] : G[unfilled] [Live Births ___] : P[unfilled]  [Full Term ___] : Full Term: [unfilled] [Living ___] : Living: [unfilled] [de-identified] : 51

## 2021-03-22 NOTE — HISTORY OF PRESENT ILLNESS
[FreeTextEntry1] : establish care\par  [de-identified] : 64 y/o F originally from [place] , [place], presenst today to establish care.\par Previous PCP: at [facility]. Not seen since 2019.\par Pt states has in 2018 work related injury while working at [employer]>Had  Lumbar surgery and left hand surgery. Since then on disability/ retired.\par She states that she has been coughing for the past 5-6 years and tried allergy medications otc before the accident but these did not work.  She had bronchitis and asthma as a child.\par \par She was seen by Gyn, Dr JAKY Schulz in February for lumps felt in left breast and diagnosed with uterine fibroma (asymptomatic, no bleeding).Schedule for removal in 3/22/21.\par States left breast lumps were biopsied last week, awaiting results.\par \par States had 1st dose of COVID vaccine, Moderna received this morning\par No flu vaccine for 2 years

## 2021-03-22 NOTE — ASSESSMENT
[FreeTextEntry1] : 64 y/o female presenting today to establish care with PCP\par \par HCM;\par -blood and UA\par -HIC/ HC screening\par \par Gyn: with Dr Schulz. Up to date.\par -Schedule for Fibroma resection 3/22/21\par \par Mammo: 2021\par -Awaiting for Bxp results\par \par Colonoscopy: 2015 at [facility]\par \par DEXA: referral\par \par Immunizations:\par -COVID 1st dose today\par \par Chronic cough/ Hx asthma childhood\par -Start Singulair\par \par Obese: Counseling was provided in diet and exercise. TLC d/w pt in details, regarding benefits of low calories and portion control. Spend > 10 minMar 08, 2021  14:00\par \par Further recommendations with results.\par \par

## 2021-03-22 NOTE — REVIEW OF SYSTEMS
[Joint Pain] : joint pain [Negative] : Heme/Lymph [FreeTextEntry9] : joint pain in the injured hand but no where else

## 2021-03-22 NOTE — PLAN
[FreeTextEntry1] : Lab test done at [facility] on 3/17/21 reviewed and EKG\par Pt with no absolute contraindication for surgical procedure. Clear from clinical stand point.

## 2021-03-22 NOTE — DATA REVIEWED
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

## 2021-03-31 ENCOUNTER — RX CHANGE (OUTPATIENT)
Age: 66
End: 2021-03-31

## 2021-04-02 ENCOUNTER — RX CHANGE (OUTPATIENT)
Age: 66
End: 2021-04-02

## 2021-04-06 ENCOUNTER — OUTPATIENT (OUTPATIENT)
Dept: OUTPATIENT SERVICES | Facility: HOSPITAL | Age: 66
LOS: 1 days | End: 2021-04-06
Payer: COMMERCIAL

## 2021-04-06 ENCOUNTER — APPOINTMENT (OUTPATIENT)
Dept: RADIOLOGY | Facility: CLINIC | Age: 66
End: 2021-04-06
Payer: MEDICARE

## 2021-04-06 DIAGNOSIS — Z98.890 OTHER SPECIFIED POSTPROCEDURAL STATES: Chronic | ICD-10-CM

## 2021-04-06 DIAGNOSIS — Z00.8 ENCOUNTER FOR OTHER GENERAL EXAMINATION: ICD-10-CM

## 2021-04-06 PROCEDURE — 77080 DXA BONE DENSITY AXIAL: CPT | Mod: 26

## 2021-04-06 PROCEDURE — 77080 DXA BONE DENSITY AXIAL: CPT

## 2021-04-26 ENCOUNTER — APPOINTMENT (OUTPATIENT)
Dept: FAMILY MEDICINE | Facility: CLINIC | Age: 66
End: 2021-04-26
Payer: MEDICARE

## 2021-04-26 VITALS
HEART RATE: 88 BPM | DIASTOLIC BLOOD PRESSURE: 78 MMHG | TEMPERATURE: 97.2 F | OXYGEN SATURATION: 98 % | RESPIRATION RATE: 16 BRPM | SYSTOLIC BLOOD PRESSURE: 126 MMHG | BODY MASS INDEX: 32.79 KG/M2 | WEIGHT: 167 LBS | HEIGHT: 60 IN

## 2021-04-26 PROCEDURE — 99213 OFFICE O/P EST LOW 20 MIN: CPT

## 2021-04-26 PROCEDURE — 99072 ADDL SUPL MATRL&STAF TM PHE: CPT

## 2021-04-26 RX ORDER — ONDANSETRON 8 MG/1
8 TABLET ORAL
Qty: 60 | Refills: 0 | Status: ACTIVE | COMMUNITY
Start: 2021-04-22

## 2021-04-26 NOTE — HEALTH RISK ASSESSMENT
[No] : In the past 12 months have you used drugs other than those required for medical reasons? No [] : No [de-identified] : [facility], Endocrinology

## 2021-04-26 NOTE — HISTORY OF PRESENT ILLNESS
[FreeTextEntry1] : f/up [de-identified] : 65 y/o F originally from [place] , [place], presents today for f/up.\par She had a recent Dx Breast cancer with Bone MTS , treated at [facility]. She started Chemo last Thursday 4/22/21 and will continue 5 cycles.\par She was seen by Gyn, Dr JAKY Schulz in February for lumps felt in left breast and diagnosed with uterine fibroma (asymptomatic, no bleeding) She had  removal in 3/22/21 which according to pt was malignant, and now need schedule for Hysterectomy at [facility].\par She is doing very well on Singulair.\par She states hse was seen by Endocrinology for abnormal TSH and was advise no need for medication now. Will continue monitoring q 3 months.\par States had 2nd dose of COVID vaccine, Moderna.\par No flu vaccine for 2 years \par

## 2021-04-26 NOTE — ASSESSMENT
[FreeTextEntry1] : 67 y/o female presenting today for f/up:\par \par left breast Cancer with Bone MTS:\par -Seen at [facility].\par -On Chemo since 4/22/21\par \par HCM;\par -blood and UA\par -HIC/ HC screening\par \par Gyn: with Dr Schulz. Up to date.\par -S/P  Fibroma resection 3/22/21\par -Needs to schedule Hyterectomy.\par \par Colonoscopy: 2015 at [facility]\par \par DEXA: Normal\par \par Immunizations:\par -COVID 2 doses Moderna.\par \par Chronic cough/ Hx asthma childhood\par -Continue Singulair> doing very well.\par \par Obese: Counseling was provided in diet and exercise. TLC d/w pt in details, regarding benefits of low calories and portion control.\par \par F/up prn.\par

## 2021-10-27 ENCOUNTER — RX RENEWAL (OUTPATIENT)
Age: 66
End: 2021-10-27

## 2021-11-01 ENCOUNTER — APPOINTMENT (OUTPATIENT)
Dept: FAMILY MEDICINE | Facility: CLINIC | Age: 66
End: 2021-11-01
Payer: MEDICARE

## 2021-11-01 VITALS
WEIGHT: 159 LBS | HEART RATE: 89 BPM | OXYGEN SATURATION: 98 % | RESPIRATION RATE: 16 BRPM | TEMPERATURE: 97.9 F | BODY MASS INDEX: 31.22 KG/M2 | SYSTOLIC BLOOD PRESSURE: 130 MMHG | HEIGHT: 60 IN | DIASTOLIC BLOOD PRESSURE: 80 MMHG

## 2021-11-01 DIAGNOSIS — Z90.710 ACQUIRED ABSENCE OF BOTH CERVIX AND UTERUS: ICD-10-CM

## 2021-11-01 DIAGNOSIS — Z90.721 ACQUIRED ABSENCE OF BOTH CERVIX AND UTERUS: ICD-10-CM

## 2021-11-01 PROCEDURE — 99214 OFFICE O/P EST MOD 30 MIN: CPT | Mod: 25

## 2021-11-01 PROCEDURE — G0009: CPT

## 2021-11-01 PROCEDURE — 90670 PCV13 VACCINE IM: CPT

## 2021-11-01 PROCEDURE — 0013A: CPT

## 2021-11-01 NOTE — PHYSICAL EXAM
Dimas Madrid is an 55 year old male.    Past Medical History:   Diagnosis Date   • Allergic rhinitis    • Allergy    • Androgen deficiency    • Anemia    • Anxiety    • Bipolar disorder (CMS/HCC)    • Carpal tunnel syndrome     RIGHT   • CKD (chronic kidney disease), stage I    • Constipation    • Depressive disorder, not elsewhere classified    • Eczematous dermatitis    • Essential hypertension, benign    • Gender identity disorder    • GERD (gastroesophageal reflux disease)    • Gout    • Hemorrhage of rectum and anus    • Hyperlipemia    • Obesity    • Other and unspecified noninfectious gastroenteritis and colitis    • Type II or unspecified type diabetes mellitus without mention of complication, not stated as uncontrolled    • Vertigo      Past Surgical History:   Procedure Laterality Date   • APPENDECTOMY  7/1990   • CARPAL TUNNEL RELEASE  09/06/2012    Right endoscopic carpal tunnel release. Right upper arm excision of soft tissue mass with the gross appearance consistent with lipoma.   • SPINAL PUNCTURE,LUMBAR,DIAGNOSTIC  06/15/2001     Family History   Problem Relation Age of Onset   • Diabetes Father    • Lung Cancer Father      Social History   Substance Use Topics   • Smoking status: Never Smoker   • Smokeless tobacco: Never Used   • Alcohol use No       Prior to Admission Meds:  (Not in a hospital admission)  Scheduled Meds:  Continuous Infusions:  PRN Meds:    Allergies:   ALLERGIES:   Allergen Reactions   • Augmentin [Amoclan] SWELLING     Possible reaction, pt felt like throat was swollen, symptoms resolved on their own   • Chocolate DIARRHEA   • Indocin RASH   • Mushroom NAUSEA   • Trazodone      jittery   • Wellbutrin [Bupropion Hcl]      jittery       Active Problems:    * No active hospital problems. *    There were no vitals taken for this visit.    ROS    Physical Exam    Assessment:      Plan:  \    Jennifer Henao LPC  7/17/2017  
[Normal] : no acute distress, well nourished, well developed and well-appearing

## 2021-11-01 NOTE — HISTORY OF PRESENT ILLNESS
[FreeTextEntry1] : f/up [de-identified] : 67 y/o F originally from [place] , [place], presents today for f/up.\par She had a recent Dx Breast cancer with Bone MTS , treated at [facility]. She is s/p Chemo completed 2 weeks ago on 10/21/21 after 21 cycles. She will start a new Immunotherapy on 11/11/21 for 4 cycles q 3 weeks.\par She was seen by Gyn, Dr JAKY Schulz in February for lumps felt in left breast and diagnosed with uterine fibroma (asymptomatic, no bleeding) She had  removal in 3/22/21 which according to pt was malignant, now s/p total Hysterectomy on 6/10/21 at [facility].\par She reports was told she if free of cancer.\par She is doing very well on Singulair.\par She states was seen by Endocrinology for abnormal TSH and was advise no need for medication now. Will continue monitoring q 3 months.\par States had 2nd dose of COVID vaccine, Moderna.\par Had flu shot on 10/07/21.\par

## 2021-11-01 NOTE — HEALTH RISK ASSESSMENT
[No] : In the past 12 months have you used drugs other than those required for medical reasons? No [] : No [de-identified] : [facility], Endocrinology

## 2021-11-01 NOTE — ASSESSMENT
[FreeTextEntry1] : 67 y/o female presenting today for f/up:\par \par left breast Cancer with Bone MTS:\par -Seen at [facility].\par -S/P Chemo since 4/22/21> completed on 10/21/21\par -She will start Immunotherapy\par \par HCM;\par -03/2021\par -HIC/ HC screening\par \par Gyn: with Dr Schulz. Up to date.\par -S/P  Fibroma resection 3/22/21\par -S/P total  Hysterectomy on 6/2021\par \par Colonoscopy: 2015 at [facility]\par \par DEXA: Normal\par \par Immunizations:\par -COVID 2 doses Moderna.> booster today\par -Prevnar today\par \par Chronic cough/ Hx asthma childhood\par -Continue Singulair> doing very well.\par \par Obese: Counseling was provided in diet and exercise. TLC d/w pt in details, regarding benefits of low calories and portion control.\par \par F/up prn.\par

## 2022-03-02 ENCOUNTER — APPOINTMENT (OUTPATIENT)
Dept: GASTROENTEROLOGY | Facility: CLINIC | Age: 67
End: 2022-03-02
Payer: MEDICARE

## 2022-03-02 VITALS
WEIGHT: 157 LBS | HEART RATE: 59 BPM | DIASTOLIC BLOOD PRESSURE: 74 MMHG | HEIGHT: 60 IN | SYSTOLIC BLOOD PRESSURE: 126 MMHG | BODY MASS INDEX: 30.82 KG/M2

## 2022-03-02 DIAGNOSIS — R11.0 NAUSEA: ICD-10-CM

## 2022-03-02 PROCEDURE — 99203 OFFICE O/P NEW LOW 30 MIN: CPT

## 2022-03-02 NOTE — ASSESSMENT
[FreeTextEntry1] : 65yo female with nausea from chemo from breast cancer\par \par Currently much improved - feel as though she just needed more time to adjust\par would continue Zofran as needed\par \par she will call if nausea worsens - consider increasing zofran, vs adding other meds and consider egd\par \par Total time spent 33 minutes, including record review, face to face time, and documentation\par

## 2022-03-02 NOTE — HISTORY OF PRESENT ILLNESS
[de-identified] : 67yo female with nausea\par \par She has metastatic breast cancer to bone maintained on chemotherapy.  Months ago she had significant nausea from her chemo but has been much better most recently. She used to need Zofran fairly frequently multiple times per week, but now not taking at all\par \par She is due for colonoscopy and will see her previous primary GI MD\par All records [facility] reviewed in detail

## 2022-05-02 ENCOUNTER — APPOINTMENT (OUTPATIENT)
Dept: FAMILY MEDICINE | Facility: CLINIC | Age: 67
End: 2022-05-02
Payer: MEDICARE

## 2022-05-02 VITALS
OXYGEN SATURATION: 98 % | DIASTOLIC BLOOD PRESSURE: 78 MMHG | WEIGHT: 165 LBS | HEIGHT: 60 IN | RESPIRATION RATE: 16 BRPM | BODY MASS INDEX: 32.39 KG/M2 | TEMPERATURE: 97.8 F | HEART RATE: 79 BPM | SYSTOLIC BLOOD PRESSURE: 132 MMHG

## 2022-05-02 DIAGNOSIS — C50.919 MALIGNANT NEOPLASM OF UNSPECIFIED SITE OF UNSPECIFIED FEMALE BREAST: ICD-10-CM

## 2022-05-02 DIAGNOSIS — C79.51 MALIGNANT NEOPLASM OF UNSPECIFIED SITE OF UNSPECIFIED FEMALE BREAST: ICD-10-CM

## 2022-05-02 PROCEDURE — 99214 OFFICE O/P EST MOD 30 MIN: CPT

## 2022-05-02 RX ORDER — LORAZEPAM 0.5 MG/1
0.5 TABLET ORAL
Qty: 4 | Refills: 0 | Status: DISCONTINUED | COMMUNITY
Start: 2022-04-07

## 2022-05-02 RX ORDER — LIDOCAINE AND PRILOCAINE 25; 25 MG/G; MG/G
2.5-2.5 CREAM TOPICAL
Qty: 30 | Refills: 0 | Status: DISCONTINUED | COMMUNITY
Start: 2022-02-24

## 2022-05-02 NOTE — ASSESSMENT
[FreeTextEntry1] : 68 y/o female presenting today for f/up:\par \par left breast Cancer with Bone MTS:\par -Seen at [facility].\par -S/P Chemo since 4/22/21> completed on 10/21/21\par -She will start Immunotherapy\par \par HCM;\par -03/2021\par -HIC/ HC screening\par \par Gyn: with Dr Schulz. Up to date.\par -S/P  Fibroma resection 3/22/21\par -S/P total  Hysterectomy on 6/2021\par \par Colonoscopy: 2015 at [facility]\par \par DEXA: Normal\par \par Immunizations:\par -COVID 3 doses Moderna.> \par -Prevnar 11/1/21\par \par Chronic cough/ Hx asthma childhood\par -Continue Singulair> doing very well.\par \par Obese: Counseling was provided in diet and exercise. TLC d/w pt in details, regarding benefits of low calories and portion control.\par \par F/up 6 months\par

## 2022-05-02 NOTE — HEALTH RISK ASSESSMENT
[No] : In the past 12 months have you used drugs other than those required for medical reasons? No [Never] : Never [de-identified] : [facility], Endocrinology

## 2022-05-02 NOTE — HISTORY OF PRESENT ILLNESS
[FreeTextEntry1] : f/up [de-identified] : 66 y/o F originally from [place] , [place], presents today for f/up.\par She had a recent Dx Breast cancer with Bone MTS , treated at [facility]. She is s/p Chemo completed on 10/21/21 after 21 cycles. She is now on Immunotherapy . Had PET scan in 04/2022..\par She was seen by Gyn, Dr JAKY Schulz in February for lumps felt in left breast and diagnosed with uterine fibroma (asymptomatic, no bleeding) She had  removal in 3/22/21 which according to pt was malignant, now s/p total Hysterectomy on 6/10/21 at [facility].\par She reports was told she if free of cancer.\par She is doing very well on Singulair.\par She states was seen by Endocrinology for abnormal TSH and was advise no need for medication now. Will continue monitoring q 3 months.\par States had 2nd dose of COVID vaccine, Moderna.\par Had flu shot on 10/07/21.\par

## 2022-05-06 NOTE — ED ADULT NURSE NOTE - NSIMPLEMENTINTERV_GEN_ALL_ED
Yes Implemented All Fall Risk Interventions:  [place] to call system. Call bell, personal items and telephone within reach. Instruct patient to call for assistance. Room bathroom lighting operational. Non-slip footwear when patient is off stretcher. Physically safe environment: no spills, clutter or unnecessary equipment. Stretcher in lowest position, wheels locked, appropriate side rails in place. Provide visual cue, wrist band, yellow gown, etc. Monitor gait and stability. Monitor for mental status changes and reorient to person, place, and time. Review medications for side effects contributing to fall risk. Reinforce activity limits and safety measures with patient and family.

## 2022-09-19 ENCOUNTER — APPOINTMENT (OUTPATIENT)
Dept: ORTHOPEDIC SURGERY | Facility: CLINIC | Age: 67
End: 2022-09-19

## 2022-09-19 ENCOUNTER — NON-APPOINTMENT (OUTPATIENT)
Age: 67
End: 2022-09-19

## 2022-09-19 PROCEDURE — 99204 OFFICE O/P NEW MOD 45 MIN: CPT

## 2022-09-19 PROCEDURE — 73630 X-RAY EXAM OF FOOT: CPT | Mod: LT

## 2022-09-19 NOTE — HISTORY OF PRESENT ILLNESS
[FreeTextEntry1] : The patient is a 67 year old female presenting for an initial evaluation of left foot pain. The patient cannot attribute their pain to any injury, fall, or trauma. Pain is localized to the underside of her great toe, with a constant timing. She reports pain while wearing shoes and with ambulation. History of breast cancer stage 4. She is wearing sandals and is walking without assistance. No other complaints.

## 2022-09-19 NOTE — DISCUSSION/SUMMARY
[de-identified] : Today I had a lengthy discussion with the patient regarding their left foot pain. I have addressed all the patient's concerns surrounding the pathology of their condition. XR imaging was completed in office today and results were reviewed with the patient. I recommend that the patient utilize a toe spacer. The patient was provided with a toe spacer in the office today. A discussion was had about shoe-wear modifications. I advised the patient to utilize a wide toed cross training sneaker that better accommodates the feet. I recommended New Balance, Yanez, Hoka, Asics, or Saucony to the patient. The patient understood and verbally agreed to the treatment plan. All of their questions were answered and they were satisfied with the visit. The patient should call the office if they have any questions or experience worsening symptoms. I would like to see the patient back in the office in 2 months to reassess their condition. 				\par

## 2022-09-19 NOTE — PHYSICAL EXAM
[de-identified] : General: Alert and oriented x3. In no acute distress. Pleasant in nature with a normal affect. No apparent respiratory distress.\par \par Left Foot Exam\par Skin: Clean, dry, intact\par Inspection: Large bunion with surrounding erythema. Extended second metatarsal. No obvious malalignment, no masses, no swelling, no effusion\par Pulses: 2+ DP/PT pulses\par ROM: FOOT Full  ROM of digits, ANKLE 10 degrees of dorsiflexion, 40 degrees of plantarflexion, 10 degrees of subtalar motion.\par Painful ROM: None\par Tenderness: No tenderness over the medial malleolus, No tenderness over the lateral malleolus, no CFL/ATFL/PTFL pain, no deltoid ligament pain. No heel pain. No Achilles tenderness. No 5th metatarsal pain. No pain to the LisFranc joint. No ttp over the posterior tibial tendon.\par Stability: Negative anterior/posterior drawer.\par Strength: 5/5 ADD/ABD/TA/GS/EHL/FHL/EDL\par Neuro: Sensation in tact to light touch throughout\par Additional tests: Negative Mortons test, negative tarsal tunnel tinels, negative single heel rise.	 [de-identified] : 3V of the left foot were ordered, obtained, and reviewed by me today, 09/19/2022, revealed: Large bunion. Extended second metatarsal.

## 2022-09-19 NOTE — ADDENDUM
[FreeTextEntry1] : I, Danay De Santiago, acted solely as a scribe for Dr. Jose Mcgregor on this date 09/19/2022.\par \par All medical record entries made by the Scribe were at my, Dr. Jose Mcgregor, direction and personally dictated by me on 09/19/2022. I have reviewed the chart and agree that the record accurately reflects my personal performance of the history, physical exam, assessment and plan. I have also personally directed, reviewed, and agreed with the chart.	\par

## 2023-01-30 ENCOUNTER — APPOINTMENT (OUTPATIENT)
Dept: FAMILY MEDICINE | Facility: CLINIC | Age: 68
End: 2023-01-30
Payer: MEDICARE

## 2023-01-30 VITALS
OXYGEN SATURATION: 97 % | BODY MASS INDEX: 32.79 KG/M2 | SYSTOLIC BLOOD PRESSURE: 128 MMHG | TEMPERATURE: 97.9 F | DIASTOLIC BLOOD PRESSURE: 72 MMHG | HEART RATE: 82 BPM | HEIGHT: 60 IN | WEIGHT: 167 LBS | RESPIRATION RATE: 14 BRPM

## 2023-01-30 DIAGNOSIS — Z00.00 ENCOUNTER FOR GENERAL ADULT MEDICAL EXAMINATION W/OUT ABNORMAL FINDINGS: ICD-10-CM

## 2023-01-30 DIAGNOSIS — R32 UNSPECIFIED URINARY INCONTINENCE: ICD-10-CM

## 2023-01-30 DIAGNOSIS — Z78.9 OTHER SPECIFIED HEALTH STATUS: ICD-10-CM

## 2023-01-30 PROCEDURE — G0439: CPT

## 2023-01-30 NOTE — PAST MEDICAL HISTORY
[Postmenopausal] : postmenopausal [Total Preg ___] : G[unfilled] [Live Births ___] : P[unfilled]  [Full Term ___] : Full Term: [unfilled] [Living ___] : Living: [unfilled] [de-identified] : 51

## 2023-01-30 NOTE — HISTORY OF PRESENT ILLNESS
[FreeTextEntry1] : establish care\par  [de-identified] : 64 y/o F originally from [place] , [place], presents today for CPE.\par Dx Breast Ca with Bone MTS, treated at [facility]> S/P Chemo 10/21/21 after 21 cycles.\par  She is now on Immunotherapy q 3 weeks. Had PET scan in 10/2022..\par She was seen by Gyn, Dr JAKY Schulz in February for lumps felt in left breast and diagnosed with uterine fibroma (asymptomatic, no bleeding) She had removal in 3/22/21 which according to pt was malignant, now s/p total Hysterectomy on 6/10/21 at [facility].\par She reports was told she if free of cancer.\par She has persistent dry cough and has mild benefits with Singulair.\par She reports urinary incontinence.\par Seen by Endocrinology , Dr sherry Alonzo ([facility])for abnormal TSH and was advise no need for medication now. \par States had 2nd dose of COVID vaccine, Moderna.\par Had flu shot on 10/07/21.\par  \par

## 2023-01-30 NOTE — ASSESSMENT
[FreeTextEntry1] :  68 y/o female presenting today for CPE\par \par left breast Cancer with Bone MTS:\par -Seen at [facility].\par -S/P Chemo since 4/22/21> completed on 10/21/21\par -On Immunotherapy q 3 weeks at [facility]\par \par HCM;\par -Blood and UA today\par -HIC/ HC screening 2021: negative\par \par Gyn: with Dr Schulz. Up to date.\par -S/P Fibroma resection 3/22/21\par -S/P total Hysterectomy on 6/2021\par \par Colonoscopy: 2015 at [facility]\par \par DEXA: Normal\par \par Immunizations:\par -COVID 3 doses Moderna.> \par -Prevnar 11/1/21\par \par Chronic cough/ Hx asthma childhood\par -Continue Singulair\par -Start Breztri> sample provided\par \par Urinary Incontinence:\par -Advise for kegel exercises.\par \par Obese: Counseling was provided in diet and exercise. TLC d/w pt in details, regarding benefits of low calories and portion control.\par \par F/up in 1 month\par \par . \par \par  \par

## 2023-01-30 NOTE — HEALTH RISK ASSESSMENT
[Very Good] : ~his/her~ current health as very good [Excellent] : ~his/her~  mood as  excellent [No] : In the past 12 months have you used drugs other than those required for medical reasons? No [No falls in past year] : Patient reported no falls in the past year [0] : 2) Feeling down, depressed, or hopeless: Not at all (0) [Patient reported mammogram was abnormal] : Patient reported mammogram was abnormal [Patient reported PAP Smear was normal] : Patient reported PAP Smear was normal [Patient reported colonoscopy was normal] : Patient reported colonoscopy was normal [None] : None [Alone] : lives alone [On disability] : on disability [Retired] : retired [Graduate School] : graduate school [] :  [# Of Children ___] : has [unfilled] children [Feels Safe at Home] : Feels safe at home [Fully functional (bathing, dressing, toileting, transferring, walking, feeding)] : Fully functional (bathing, dressing, toileting, transferring, walking, feeding) [Fully functional (using the telephone, shopping, preparing meals, housekeeping, doing laundry, using] : Fully functional and needs no help or supervision to perform IADLs (using the telephone, shopping, preparing meals, housekeeping, doing laundry, using transportation, managing medications and managing finances) [Reports changes in dental health] : Reports changes in dental health [Smoke Detector] : smoke detector [Carbon Monoxide Detector] : carbon monoxide detector [Safety elements used in home] : safety elements used in home [Seat Belt] :  uses seat belt [Sunscreen] : uses sunscreen [Never] : Never [PHQ-2 Negative - No further assessment needed] : PHQ-2 Negative - No further assessment needed [Patient reported bone density results were normal] : Patient reported bone density results were normal [HIV test declined] : HIV test declined [Hepatitis C test declined] : Hepatitis C test declined [With Patient/Caregiver] : , with patient/caregiver [Designated Healthcare Proxy] : Designated healthcare proxy [Name: ___] : Health Care Proxy's Name: [unfilled]  [Relationship: ___] : Relationship: [unfilled] [de-identified] : yoga [de-identified] : well-rounded [[id number]] : 0 [Change in mental status noted] : No change in mental status noted [Language] : denies difficulty with language [Behavior] : denies difficulty with behavior [Learning/Retaining New Information] : denies difficulty learning/retaining new information [Handling Complex Tasks] : denies difficulty handling complex tasks [Reasoning] : denies difficulty with reasoning [Spatial Ability and Orientation] : denies difficulty with spatial ability and orientation [Sexually Active] : not sexually active [High Risk Behavior] : no high risk behavior [Reports changes in hearing] : Reports no changes in hearing [Reports changes in vision] : Reports no changes in vision [Reports normal functional visual acuity (ie: able to read med bottle)] : Reports poor functional visual acuity.  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

## 2023-01-30 NOTE — DATA REVIEWED
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

## 2023-02-01 DIAGNOSIS — R74.8 ABNORMAL LEVELS OF OTHER SERUM ENZYMES: ICD-10-CM

## 2023-02-01 DIAGNOSIS — R79.89 OTHER SPECIFIED ABNORMAL FINDINGS OF BLOOD CHEMISTRY: ICD-10-CM

## 2023-02-03 ENCOUNTER — NON-APPOINTMENT (OUTPATIENT)
Age: 68
End: 2023-02-03

## 2023-02-03 LAB
25(OH)D3 SERPL-MCNC: 63.3 NG/ML
ALBUMIN SERPL ELPH-MCNC: 4.5 G/DL
ALP BLD-CCNC: 180 U/L
ALT SERPL-CCNC: 28 U/L
ANION GAP SERPL CALC-SCNC: 13 MMOL/L
APPEARANCE: CLEAR
AST SERPL-CCNC: 22 U/L
BASOPHILS # BLD AUTO: 0.03 K/UL
BASOPHILS NFR BLD AUTO: 0.5 %
BILIRUB SERPL-MCNC: 0.2 MG/DL
BILIRUBIN URINE: NEGATIVE
BLOOD URINE: NEGATIVE
BUN SERPL-MCNC: 16 MG/DL
CALCIUM SERPL-MCNC: 10.3 MG/DL
CHLORIDE SERPL-SCNC: 101 MMOL/L
CHOLEST SERPL-MCNC: 173 MG/DL
CO2 SERPL-SCNC: 26 MMOL/L
COLOR: NORMAL
CREAT SERPL-MCNC: 0.83 MG/DL
EGFR: 77 ML/MIN/1.73M2
EOSINOPHIL # BLD AUTO: 0.06 K/UL
EOSINOPHIL NFR BLD AUTO: 1 %
GGT SERPL-CCNC: 35 U/L
GLUCOSE QUALITATIVE U: NEGATIVE
GLUCOSE SERPL-MCNC: 99 MG/DL
HCT VFR BLD CALC: 42.8 %
HDLC SERPL-MCNC: 49 MG/DL
HGB BLD-MCNC: 13.1 G/DL
IMM GRANULOCYTES NFR BLD AUTO: 0.3 %
KETONES URINE: NEGATIVE
LDLC SERPL CALC-MCNC: 81 MG/DL
LEUKOCYTE ESTERASE URINE: NEGATIVE
LYMPHOCYTES # BLD AUTO: 1.47 K/UL
LYMPHOCYTES NFR BLD AUTO: 23.7 %
MAN DIFF?: NORMAL
MCHC RBC-ENTMCNC: 28.1 PG
MCHC RBC-ENTMCNC: 30.6 GM/DL
MCV RBC AUTO: 91.6 FL
MONOCYTES # BLD AUTO: 0.43 K/UL
MONOCYTES NFR BLD AUTO: 6.9 %
NEUTROPHILS # BLD AUTO: 4.2 K/UL
NEUTROPHILS NFR BLD AUTO: 67.6 %
NITRITE URINE: NEGATIVE
NONHDLC SERPL-MCNC: 124 MG/DL
PH URINE: 6.5
PLATELET # BLD AUTO: 145 K/UL
POTASSIUM SERPL-SCNC: 4.6 MMOL/L
PROT SERPL-MCNC: 7.3 G/DL
PROTEIN URINE: NEGATIVE
RBC # BLD: 4.67 M/UL
RBC # FLD: 14.1 %
SODIUM SERPL-SCNC: 140 MMOL/L
SPECIFIC GRAVITY URINE: 1.01
T3FREE SERPL-MCNC: 3.64 PG/ML
T4 FREE SERPL-MCNC: 0.9 NG/DL
TRIGL SERPL-MCNC: 217 MG/DL
TSH SERPL-ACNC: 0.24 UIU/ML
UROBILINOGEN URINE: NORMAL
WBC # FLD AUTO: 6.21 K/UL

## 2023-02-05 LAB
ALP BLD-CCNC: 178 IU/L
ALP BONE CFR SERPL: 39 %
ALP INTEST CFR SERPL: 3 %
ALP LIVER CFR SERPL: 58 %

## 2023-02-10 ENCOUNTER — APPOINTMENT (OUTPATIENT)
Dept: ORTHOPEDIC SURGERY | Facility: CLINIC | Age: 68
End: 2023-02-10
Payer: MEDICARE

## 2023-02-10 DIAGNOSIS — M21.612 BUNION OF LEFT FOOT: ICD-10-CM

## 2023-02-10 DIAGNOSIS — B35.1 TINEA UNGUIUM: ICD-10-CM

## 2023-02-10 PROCEDURE — 99213 OFFICE O/P EST LOW 20 MIN: CPT

## 2023-02-10 NOTE — DISCUSSION/SUMMARY
[de-identified] : Today I had a lengthy discussion with the patient regarding their left foot pain. I have addressed all the patient's concerns surrounding the pathology of their condition. I recommend that the patient continue to utilize a toe spacer. She was instructed to followup with podiatry for onicomicosis of the right great toe. She is able to use Vicks vapor rub on the affected area. The patient understood and verbally agreed to the treatment plan. All of their questions were answered and they were satisfied with the visit. The patient should call the office if they have any questions or experience worsening symptoms. I would like to see the patient back in the office \par

## 2023-02-10 NOTE — ADDENDUM
[FreeTextEntry1] : I, Kristine Reinoso, acted solely as a scribe for Dr. Jose Mcgregor on this date 02/10/2023 .\par  \par All medical record entries made by the Scribe were at my, Dr. Jose Mcgregor, direction and personally dictated by me on 02/10/2023 . I have reviewed the chart and agree that the record accurately reflects my personal performance of the history, physical exam, assessment and plan. I have also personally directed, reviewed, and agreed with the chart.

## 2023-02-10 NOTE — HISTORY OF PRESENT ILLNESS
[FreeTextEntry1] : 2/10/2023: The patient is a 68 y/o female presenting for an followup evaluation of left foot pain. She reports 90% improvements using a bunion spacer. She also has concerns of a fungus. She reports pain while wearing shoes and with ambulation. History of breast cancer stage 4. She is wearing sneakers and is walking without assistance. No other complaints. \par \par 9/19/2022: The patient is a 67 year old female presenting for an initial evaluation of left foot pain. The patient cannot attribute their pain to any injury, fall, or trauma. Pain is localized to the underside of her great toe, with a constant timing. She reports pain while wearing shoes and with ambulation. History of breast cancer stage 4. She is wearing sandals and is walking without assistance. No other complaints. \par \par \par .

## 2023-02-10 NOTE — PHYSICAL EXAM
[de-identified] : General: Alert and oriented x3. In no acute distress. Pleasant in nature with a normal affect. No apparent respiratory distress.\par \par Left Foot Exam\par Skin: Clean, dry, intact\par Inspection: Large bunion with surrounding erythema. Extended second metatarsal. No obvious malalignment, no masses, no swelling, no effusion\par Pulses: 2+ DP/PT pulses\par ROM: FOOT Full  ROM of digits, ANKLE 10 degrees of dorsiflexion, 40 degrees of plantarflexion, 10 degrees of subtalar motion.\par Painful ROM: None\par Tenderness: No tenderness over the medial malleolus, No tenderness over the lateral malleolus, no CFL/ATFL/PTFL pain, no deltoid ligament pain. No heel pain. No Achilles tenderness. No 5th metatarsal pain. No pain to the LisFranc joint. No ttp over the posterior tibial tendon.\par Stability: Negative anterior/posterior drawer.\par Strength: 5/5 ADD/ABD/TA/GS/EHL/FHL/EDL\par Neuro: Sensation in tact to light touch throughout\par Additional tests: Negative Mortons test, negative tarsal tunnel tinels, negative single heel rise.	\par \par Right Foot Exam\par Skin: Clean, dry, intact\par Inspection: onicomicosis R great toe.  No obvious malalignment, no masses, no swelling, no effusion\par Pulses: 2+ DP/PT pulses\par ROM: FOOT Full  ROM of digits, ANKLE 10 degrees of dorsiflexion, 40 degrees of plantarflexion, 10 degrees of subtalar motion.\par Painful ROM: None\par Tenderness: No tenderness over the medial malleolus, No tenderness over the lateral malleolus, no CFL/ATFL/PTFL pain, no deltoid ligament pain. No heel pain. No Achilles tenderness. No 5th metatarsal pain. No pain to the LisFranc joint. No ttp over the posterior tibial tendon.\par Stability: Negative anterior/posterior drawer.\par Strength: 5/5 ADD/ABD/TA/GS/EHL/FHL/EDL\par Neuro: Sensation in tact to light touch throughout\par Additional tests: Negative Mortons test, negative tarsal tunnel tinels, negative single heel rise.\par

## 2023-02-27 ENCOUNTER — APPOINTMENT (OUTPATIENT)
Dept: FAMILY MEDICINE | Facility: CLINIC | Age: 68
End: 2023-02-27
Payer: MEDICARE

## 2023-02-27 VITALS
BODY MASS INDEX: 32.59 KG/M2 | OXYGEN SATURATION: 98 % | WEIGHT: 166 LBS | TEMPERATURE: 98.1 F | DIASTOLIC BLOOD PRESSURE: 80 MMHG | RESPIRATION RATE: 16 BRPM | HEART RATE: 77 BPM | SYSTOLIC BLOOD PRESSURE: 134 MMHG | HEIGHT: 60 IN

## 2023-02-27 DIAGNOSIS — L98.9 DISORDER OF THE SKIN AND SUBCUTANEOUS TISSUE, UNSPECIFIED: ICD-10-CM

## 2023-02-27 PROCEDURE — 99214 OFFICE O/P EST MOD 30 MIN: CPT

## 2023-02-27 NOTE — HISTORY OF PRESENT ILLNESS
[FreeTextEntry1] : F/up [de-identified] : Dx Breast Ca with Bone MTS, [place] [facility] [place]/P Chemo 10/21/21 after 21 cycles.\par  She is now on Immunotherapy q 3 weeks. Had PET scan in 10/2022..\par She was seen by Gyn, Dr JAKY Schulz in February for lumps felt in left breast and diagnosed with uterine fibroma (asymptomatic, no bleeding) She had removal in 3/22/21 which according to pt was malignant, now s/p total Hysterectomy on 6/10/21 at [facility].\par She reports was told she if free of cancer.\par She has persistent dry cough and did very well with Breztri., mild benefits with Singulair.\par Sin lesio in RT ankle, itchy.\par Seen by Endocrinology , Dr sherry Alonzo ([facility])for abnormal TSH and was advise no need for medication now. \par States had 2nd dose of COVID vaccine, Moderna.\par Had flu shot on 10/07/21.\par

## 2023-02-27 NOTE — ASSESSMENT
[FreeTextEntry1] : Left breast Cancer with Bone MTS:\par -Seen at [facility].\par -S/P Chemo since 4/22/21> completed on 10/21/21\par -On Immunotherapy q 3 weeks at [facility]\par \par HCM;\par -01/2023\par -HIC/ HC screening 2021: negative\par \par Gyn: with Dr Schulz. Up to date.\par -S/P Fibroma resection 3/22/21\par -S/P total Hysterectomy on 6/2021\par \par Colonoscopy: 2015 at [facility]\par \par DEXA: Normal\par \par Immunizations:\par -COVID 3 doses Moderna.> \par -Prevnar 11/1/21\par \par Chronic cough/ Hx asthma childhood\par -Continue Singulair\par -Doing very well on Breztri\par \par Dermatosis:\par -Clotrimazole\par \par Urinary Incontinence:\par -Advise for kegel exercises.\par \par Obese: Counseling was provided in diet and exercise. TLC d/w pt in details, regarding benefits of low calories and portion control.\par \par F/up prn\par

## 2023-04-24 ENCOUNTER — RX RENEWAL (OUTPATIENT)
Age: 68
End: 2023-04-24

## 2023-06-21 ENCOUNTER — RX RENEWAL (OUTPATIENT)
Age: 68
End: 2023-06-21

## 2023-08-21 ENCOUNTER — APPOINTMENT (OUTPATIENT)
Dept: FAMILY MEDICINE | Facility: CLINIC | Age: 68
End: 2023-08-21
Payer: MEDICARE

## 2023-08-21 VITALS
OXYGEN SATURATION: 98 % | HEIGHT: 60 IN | SYSTOLIC BLOOD PRESSURE: 130 MMHG | HEART RATE: 94 BPM | TEMPERATURE: 98.1 F | BODY MASS INDEX: 31.02 KG/M2 | DIASTOLIC BLOOD PRESSURE: 74 MMHG | WEIGHT: 158 LBS | RESPIRATION RATE: 14 BRPM

## 2023-08-21 DIAGNOSIS — Z51.12 ENCOUNTER FOR ANTINEOPLASTIC IMMUNOTHERAPY: ICD-10-CM

## 2023-08-21 PROCEDURE — 36415 COLL VENOUS BLD VENIPUNCTURE: CPT

## 2023-08-21 PROCEDURE — 99214 OFFICE O/P EST MOD 30 MIN: CPT | Mod: 25

## 2023-08-21 RX ORDER — CLOTRIMAZOLE 10 MG/G
1 CREAM TOPICAL 3 TIMES DAILY
Qty: 1 | Refills: 2 | Status: DISCONTINUED | COMMUNITY
Start: 2023-02-27 | End: 2023-08-21

## 2023-08-21 NOTE — HISTORY OF PRESENT ILLNESS
[FreeTextEntry1] : F/up [de-identified] : D[place] with Bone MTS[place] [facility] [place]/P Chemo 10/21/21 after 21 cycles.  She is now on Immunotherapy q 3 weeks. Had PET scan in 10/2022.. She was seen by Gyn, Dr JAKY Schulz in February for lumps felt in left breast and diagnosed with uterine fibroma (asymptomatic, no bleeding) She had removal in 3/22/21 which according to pt was malignant, now s/p total Hysterectomy on 6/10/21 at [facility] [place]. She reports was told she if free of cancer. She has persistent dry cough and did very well with Breztri., mild benefits with Singulair. Sin lesio in RT ankle, itchy. Seen by Endocrinology , Dr sherry Alonzo ([facility])for abnormal TSH and was advise no need for medication now.  States had 2nd dose of COVID vaccine, Moderna. Had flu shot on 10/07/21.

## 2023-08-21 NOTE — ASSESSMENT
[FreeTextEntry1] : Left breast Cancer with Bone MTS: -Seen at [facility] [place]. -S/P Chemo since 4/22/21> completed on 10/21/21 -On Immunotherapy q 3 weeks at [facility]  HCM; -01/2023 -HIC/ HC screening 2021: negative  Gyn: with Dr Schulz. Up to date. -S/P Fibroma resection 3/22/21 -S/P total Hysterectomy on 6/2021  Colonoscopy: 2015 at [facility]  DEXA: Normal  Immunizations: -COVID 3 doses Moderna.>  -Prevnar 11/1/21  Chronic cough/ Hx asthma childhood -Continue Singulair -Doing very well on Breztri  Dermatosis: -Clotrimazole  Urinary Incontinence: -Advise for kegel exercises.  Obese: Counseling was provided in diet and exercise. TLC d/w pt in details, regarding benefits of low calories and portion control.  F/up for CPE in 6 months or prn.

## 2023-08-23 LAB
ALBUMIN SERPL ELPH-MCNC: 4.7 G/DL
ALP BLD-CCNC: 184 U/L
ALT SERPL-CCNC: 26 U/L
AST SERPL-CCNC: 20 U/L
BILIRUB DIRECT SERPL-MCNC: 0.1 MG/DL
BILIRUB INDIRECT SERPL-MCNC: 0.1 MG/DL
BILIRUB SERPL-MCNC: 0.2 MG/DL
CHOLEST SERPL-MCNC: 157 MG/DL
HDLC SERPL-MCNC: 57 MG/DL
LDLC SERPL CALC-MCNC: 70 MG/DL
NONHDLC SERPL-MCNC: 101 MG/DL
PROT SERPL-MCNC: 7 G/DL
TRIGL SERPL-MCNC: 185 MG/DL

## 2023-08-24 ENCOUNTER — NON-APPOINTMENT (OUTPATIENT)
Age: 68
End: 2023-08-24

## 2023-10-23 ENCOUNTER — RX RENEWAL (OUTPATIENT)
Age: 68
End: 2023-10-23

## 2024-01-08 ENCOUNTER — APPOINTMENT (OUTPATIENT)
Dept: FAMILY MEDICINE | Facility: CLINIC | Age: 69
End: 2024-01-08
Payer: MEDICARE

## 2024-01-08 VITALS
RESPIRATION RATE: 14 BRPM | WEIGHT: 151 LBS | OXYGEN SATURATION: 98 % | DIASTOLIC BLOOD PRESSURE: 84 MMHG | HEIGHT: 60 IN | TEMPERATURE: 97.7 F | SYSTOLIC BLOOD PRESSURE: 138 MMHG | HEART RATE: 88 BPM | BODY MASS INDEX: 29.64 KG/M2

## 2024-01-08 DIAGNOSIS — E78.00 PURE HYPERCHOLESTEROLEMIA, UNSPECIFIED: ICD-10-CM

## 2024-01-08 DIAGNOSIS — Z23 ENCOUNTER FOR IMMUNIZATION: ICD-10-CM

## 2024-01-08 DIAGNOSIS — R05.3 CHRONIC COUGH: ICD-10-CM

## 2024-01-08 DIAGNOSIS — Z71.85 ENCOUNTER FOR IMMUNIZATION SAFETY COUNSELING: ICD-10-CM

## 2024-01-08 PROCEDURE — 90677 PCV20 VACCINE IM: CPT

## 2024-01-08 PROCEDURE — G0009: CPT

## 2024-01-08 PROCEDURE — 36415 COLL VENOUS BLD VENIPUNCTURE: CPT

## 2024-01-08 PROCEDURE — 99214 OFFICE O/P EST MOD 30 MIN: CPT | Mod: 25

## 2024-01-08 RX ORDER — BUDESONIDE, GLYCOPYRROLATE, AND FORMOTEROL FUMARATE 160; 9; 4.8 UG/1; UG/1; UG/1
160-9-4.8 AEROSOL, METERED RESPIRATORY (INHALATION) TWICE DAILY
Qty: 2 | Refills: 3 | Status: ACTIVE | COMMUNITY
Start: 2023-02-27 | End: 1900-01-01

## 2024-01-08 RX ORDER — MONTELUKAST 10 MG/1
10 TABLET, FILM COATED ORAL
Qty: 90 | Refills: 1 | Status: ACTIVE | COMMUNITY
Start: 2021-03-08 | End: 1900-01-01

## 2024-01-08 NOTE — HISTORY OF PRESENT ILLNESS
[FreeTextEntry1] : F/up [de-identified] : Dx Breast Ca with Bone MTS, treated at [facility] [place]> S/P Chemo 10/21/21 after 21 cycles.  She is now on Immunotherapy q 3 weeks. Had PET scan in 10/2022.. She was seen by Gyn, Dr JAKY Schulz in February for lumps felt in left breast and diagnosed with uterine fibroma (asymptomatic, no bleeding) She had removal in 3/22/21 which according to pt was malignant, now s/p total Hysterectomy on 6/10/21 at [facility] [place]. She reports was told she if free of cancer. She has persistent dry cough and did very well with Breztri., mild benefits with Singulair. RT Knee pain post fall > 1 year ago> seen by orthopedic Dr Gifford> advise for Knee replacement. Will try Injection 1st to be schedule. Seen by Endocrinology , Dr sherry Alonzo ([facility])for abnormal TSH and was advise no need for medication now.  States had 2nd dose of COVID vaccine, Moderna. Had flu shot on 10/07/21.

## 2024-01-08 NOTE — ASSESSMENT
[FreeTextEntry1] : Left breast Cancer with Bone MTS: -Seen at [facility] [place]. -S/P Chemo since 4/22/21> completed on 10/21/21 -On Immunotherapy q 3 weeks at [facility]  HCM; -01/2023 -HIC/ HC screening 2021: negative  Gyn: with Dr Schulz. Up to date. -S/P Fibroma resection 3/22/21 -S/P total Hysterectomy on 6/2021  Colonoscopy: 2015 at [facility]  DEXA: Normal  Immunizations: -COVID 3 doses Moderna.> -Prevnar 11/1/21 -PCV20 today  Chronic cough/ Hx asthma childhood -Continue Singulair -Doing very well on Breztri  Dermatosis: -Clotrimazole  Urinary Incontinence: -Advise for kegel exercises.  Obese: Counseling was provided in diet and exercise. TLC d/w pt in details, regarding benefits of low calories and portion control.  F/up for CPE in 6 months or prn.

## 2024-01-08 NOTE — HEALTH RISK ASSESSMENT
[No] : In the past 12 months have you used drugs other than those required for medical reasons? No [Never] : Never [de-identified] : Orthopedic

## 2024-01-09 LAB
ALBUMIN SERPL ELPH-MCNC: 4 G/DL
ALP BLD-CCNC: 158 U/L
ALT SERPL-CCNC: 30 U/L
ANION GAP SERPL CALC-SCNC: 12 MMOL/L
AST SERPL-CCNC: 24 U/L
BILIRUB SERPL-MCNC: 0.2 MG/DL
BUN SERPL-MCNC: 18 MG/DL
CALCIUM SERPL-MCNC: 9.5 MG/DL
CHLORIDE SERPL-SCNC: 102 MMOL/L
CHOLEST SERPL-MCNC: 141 MG/DL
CO2 SERPL-SCNC: 28 MMOL/L
CREAT SERPL-MCNC: 0.71 MG/DL
EGFR: 93 ML/MIN/1.73M2
GLUCOSE SERPL-MCNC: 100 MG/DL
HDLC SERPL-MCNC: 41 MG/DL
LDLC SERPL CALC-MCNC: 63 MG/DL
NONHDLC SERPL-MCNC: 100 MG/DL
POTASSIUM SERPL-SCNC: 4.6 MMOL/L
PROT SERPL-MCNC: 6.7 G/DL
SODIUM SERPL-SCNC: 141 MMOL/L
TRIGL SERPL-MCNC: 227 MG/DL

## 2024-02-29 ENCOUNTER — APPOINTMENT (OUTPATIENT)
Dept: FAMILY MEDICINE | Facility: CLINIC | Age: 69
End: 2024-02-29
Payer: MEDICARE

## 2024-02-29 VITALS
DIASTOLIC BLOOD PRESSURE: 82 MMHG | WEIGHT: 150.03 LBS | RESPIRATION RATE: 14 BRPM | HEART RATE: 93 BPM | SYSTOLIC BLOOD PRESSURE: 144 MMHG | BODY MASS INDEX: 29.45 KG/M2 | HEIGHT: 60 IN | OXYGEN SATURATION: 98 % | TEMPERATURE: 98 F

## 2024-02-29 DIAGNOSIS — H92.02 OTALGIA, LEFT EAR: ICD-10-CM

## 2024-02-29 DIAGNOSIS — R04.0 EPISTAXIS: ICD-10-CM

## 2024-02-29 DIAGNOSIS — H61.23 IMPACTED CERUMEN, BILATERAL: ICD-10-CM

## 2024-02-29 DIAGNOSIS — R09.81 NASAL CONGESTION: ICD-10-CM

## 2024-02-29 PROCEDURE — 99213 OFFICE O/P EST LOW 20 MIN: CPT | Mod: 25

## 2024-02-29 PROCEDURE — 69210 REMOVE IMPACTED EAR WAX UNI: CPT

## 2024-02-29 NOTE — HEALTH RISK ASSESSMENT
[No] : In the past 12 months have you used drugs other than those required for medical reasons? No [Never] : Never [de-identified] : Orthopedic

## 2024-02-29 NOTE — HEALTH RISK ASSESSMENT
[No] : In the past 12 months have you used drugs other than those required for medical reasons? No [Never] : Never [de-identified] : Orthopedic

## 2024-02-29 NOTE — PHYSICAL EXAM
[Normal] : normal rate, regular rhythm, normal S1 and S2 and no murmur heard [de-identified] : RIGHT TM  normal, LEFT ear canal occluded with cerumen, unable to visualize membrane.

## 2024-02-29 NOTE — HISTORY OF PRESENT ILLNESS
[FreeTextEntry8] : 69 y/o female presenting complaining of 3-day h/o LEFT ear pain rated 8/10 when worse, has been getting better the past 2 days, now just discomfort. Pt denies any otorrhea, endorses Left sided throat pain. Pt denies taking any medications for it.

## 2024-02-29 NOTE — ASSESSMENT
[FreeTextEntry1] : 67 y/o female p/w LEFT ear pain f/w excessive cerumen and epistaxis. -LEFT ear lavage in house -Acetic acid - Hydrocortisone otic solution to LEFT ear -Azelastine NS bid  RTO prn with Dr Sidhu

## 2024-02-29 NOTE — PHYSICAL EXAM
[Normal] : normal rate, regular rhythm, normal S1 and S2 and no murmur heard [de-identified] : RIGHT TM  normal, LEFT ear canal occluded with cerumen, unable to visualize membrane.

## 2024-03-04 ENCOUNTER — RX RENEWAL (OUTPATIENT)
Age: 69
End: 2024-03-04

## 2024-03-07 RX ORDER — HYDROCORTISONE AND ACETIC ACID OTIC 20.75; 10.375 MG/ML; MG/ML
1-2 SOLUTION AURICULAR (OTIC)
Qty: 1 | Refills: 1 | Status: DISCONTINUED | COMMUNITY
Start: 2024-02-29 | End: 2024-03-07

## 2024-03-07 RX ORDER — ACETIC ACID 20 MG/ML
2 SOLUTION AURICULAR (OTIC) DAILY
Qty: 1 | Refills: 0 | Status: ACTIVE | COMMUNITY
Start: 2024-03-07 | End: 1900-01-01

## 2024-05-16 ENCOUNTER — RX RENEWAL (OUTPATIENT)
Age: 69
End: 2024-05-16

## 2024-05-16 RX ORDER — AZELASTINE HYDROCHLORIDE 137 UG/1
137 SPRAY, METERED NASAL
Qty: 1 | Refills: 0 | Status: ACTIVE | COMMUNITY
Start: 2024-02-29 | End: 1900-01-01

## 2024-06-02 NOTE — ASU PREOP CHECKLIST - BP NONINVASIVE SYSTOLIC (MM HG)
Pt was playing basketball today and landed awkwardly on L ankle. Ankle is +swollen. Took 1000mg tylenol at 1900. Unable to bare weight     Triage Assessment (Pediatric)       Row Name 06/01/24 2000          Triage Assessment    Airway WDL WDL        Respiratory WDL    Respiratory WDL WDL        Cardiac WDL    Cardiac WDL WDL                      142

## 2024-06-17 ENCOUNTER — APPOINTMENT (OUTPATIENT)
Dept: ORTHOPEDIC SURGERY | Facility: CLINIC | Age: 69
End: 2024-06-17
Payer: MEDICARE

## 2024-06-17 VITALS — BODY MASS INDEX: 29.45 KG/M2 | WEIGHT: 150 LBS | HEIGHT: 60 IN

## 2024-06-17 DIAGNOSIS — S89.91XS UNSPECIFIED INJURY OF RIGHT LOWER LEG, SEQUELA: ICD-10-CM

## 2024-06-17 DIAGNOSIS — M25.561 PAIN IN RIGHT KNEE: ICD-10-CM

## 2024-06-17 DIAGNOSIS — M17.11 UNILATERAL PRIMARY OSTEOARTHRITIS, RIGHT KNEE: ICD-10-CM

## 2024-06-17 DIAGNOSIS — M79.18 MYALGIA, OTHER SITE: ICD-10-CM

## 2024-06-17 PROCEDURE — 99204 OFFICE O/P NEW MOD 45 MIN: CPT

## 2024-06-17 RX ORDER — CELECOXIB 200 MG/1
200 CAPSULE ORAL
Qty: 60 | Refills: 2 | Status: ACTIVE | COMMUNITY
Start: 2024-06-17 | End: 1900-01-01

## 2024-06-17 NOTE — DISCUSSION/SUMMARY
[de-identified] : Reviewed all X Ray images and MRI scans with patient today and interpretation was provided. Patient states they received Hyaluronic Acid Injections in March and CSI injections two weeks ago.  Patient was given prescription of formal physical therapy that she will perform 2x/wk for 6-8 wks.  Advised patient to wear Reparel knee sleeve, informational card provided. Advised patient to stop mobic and start Celebrex. Patient will follow up in 6 weeks.    ----------------------------------------------- Home Exercise The patient is instructed on a home exercise program.   JUAN AMOR Acting as a Scribe for Dr. Kayode MELARA, Juan Amor, attest that this documentation has been prepared under the direction and in the presence of Provider Dr. Headley.   Activity Modification The patient was advised to modify their activities.   Dx / Natural History The patient was advised of the diagnosis.  The natural history of the pathology was explained in full to the patient in layman's terms.  Several different treatment options were discussed and explained in full to the patient including the risks and benefits of both surgical and non-surgical treatments.  All questions and concerns were answered.   Pain Guide Activities The patient was advised to let pain guide the gradual advancement of activities.   RICE I explained to the patient that rest, ice, compression, and elevation would benefit them.  They may return to activity after follow-up or when they no longer have any pain.   The patient's current medication management of their orthopedic diagnosis was reviewed today: (1) We discussed a comprehensive treatment plan that included possible pharmaceutical management involving the use of prescription strength medications including but not limited to options such as oral Naprosyn 500mg BID, once daily Meloxicam 15 mg, or 500-650 mg Tylenol versus over the counter oral medications and topical prescription NSAID Pennsaid vs over the counter Voltaren gel. (2) There is a moderate risk of morbidity with further treatment, especially from use of prescription strength medications and possible side effects of these medications which include upset stomach with oral medications, skin reactions to topical medications and cardiac/renal issues with long term use. (3) I recommended that the patient follow-up with their medical physician to discuss any significant specific potential issues with long term medication use such as interactions with current medications or with exacerbation of underlying medical comorbidities. (4) The benefits and risks associated with use of injectable, oral or topical, prescription and over the counter anti-inflammatory medications were discussed with the patient. The patient voiced understanding of the risks including but not limited to bleeding, stroke, kidney dysfunction, heart disease, and were referred to the black box warning label for further information.

## 2024-06-17 NOTE — HISTORY OF PRESENT ILLNESS
[de-identified] : The patient is a 69 year old [right/left] hand dominant female who presents today complaining of right medial knee pain Date of Injury/Onset: 1 year ago Pain:    At Rest: 7/10  With Activity:  7/10  Mechanism of injury: fell This is not a Work Related Injury being treated under Worker's Compensation. This is not an athletic injury occurring associated with an interscholastic or organized sports team. Quality of symptoms: sharp, shooting, tightness, stiffness Improves with: Diclofenac, Compression sleeve Worse with: activity, stairs, bending Prior treatment: CSI 6/12/24, Gel injections - last on 3/5/24 Prior Imaging: zprad xray 12/13/23, mri right knee 12/20/23, Diclofenac, Meloxicam Out of work/sport: _, since _ School/Sport/Position/Occupation:_ Additional Information: None

## 2024-06-17 NOTE — DATA REVIEWED
[FreeTextEntry1] : 12/13/23 [facility] X RAY Right Knee This scan was reviewed and interpreted by Dr. Headley, and his findings are-  Impression: advanced medial OA  12/20/23 ZP MRI Right Knee This scan was reviewed and interpreted by Dr. Headley, and his findings are-  Tricompartmental degenerative changes, most pronounced in the meidal femorotibial compartment  Subchondral sclerosis in the weightbearing surface of the medial femoral condyle, consistent with trabecular injury/microfracture. No disruption of the articular surface.

## 2024-06-17 NOTE — ADDENDUM
[FreeTextEntry1] :  Documented by Maxine Amor acting as a scribe for Dr. Headley and Ezequiel Cruz PA-C on 06/17/2024 and was presence for the following sections: Physical Exam; Data Reviewed; Assessment; Discussion/Summary. All medical record entries made by the Scribe were at my, Dr. Headley, and Ezequiel Cruz, direction and personally dictated by me on 06/17/2024. I have reviewed the chart and agree that the record accurately reflects my personal performance of the history, physical exam, procedure and imaging.

## 2024-06-17 NOTE — PHYSICAL EXAM
[de-identified] : Neurovascularly intact distally   Right Knee:  ROM 0-120 with crepitus no effusion Medial joint line tenderness  Lateral joint line tenderness  Medial facet of patella tenderness  Lateral patellar facet tenderness

## 2024-07-02 ENCOUNTER — RX RENEWAL (OUTPATIENT)
Age: 69
End: 2024-07-02

## 2024-07-05 ENCOUNTER — RX RENEWAL (OUTPATIENT)
Age: 69
End: 2024-07-05

## 2024-07-29 ENCOUNTER — APPOINTMENT (OUTPATIENT)
Dept: ORTHOPEDIC SURGERY | Facility: CLINIC | Age: 69
End: 2024-07-29

## 2024-07-29 DIAGNOSIS — M79.18 MYALGIA, OTHER SITE: ICD-10-CM

## 2024-07-29 DIAGNOSIS — S89.91XS UNSPECIFIED INJURY OF RIGHT LOWER LEG, SEQUELA: ICD-10-CM

## 2024-07-29 PROCEDURE — 20611 DRAIN/INJ JOINT/BURSA W/US: CPT | Mod: RT

## 2024-07-29 PROCEDURE — 99214 OFFICE O/P EST MOD 30 MIN: CPT | Mod: 25

## 2024-07-29 NOTE — HISTORY OF PRESENT ILLNESS
[de-identified] : The patient is a 69 year old [right/left] hand dominant female who presents today complaining of right medial knee pain Date of Injury/Onset: 1 year ago Pain:    At Rest: 8/10  With Activity:  7/10  Mechanism of injury: fell This is not a Work Related Injury being treated under Worker's Compensation. This is not an athletic injury occurring associated with an interscholastic or organized sports team. Quality of symptoms: sharp, shooting, tightness, stiffness Improves with: Diclofenac, and Celebrex, Compression sleeve Worse with: activity, stairs, bending Treatment/ Imaging since last visit: Physical Therapy 2x per week  Out of work/sport: _, since _ School/Sport/Position/Occupation:  Changes since last visit: Feeling better, reports that she still is limited and that extended activities increase pain.  Additional Information: None

## 2024-07-29 NOTE — PHYSICAL EXAM
[de-identified] : Neurologic: normal sensation, normal mood and affect, orientated and able to communicate  Right Knee: medial joint line tenderness +medial Gustavo's +locking Full ROM  Pain with full extension  Medial buckling

## 2024-07-29 NOTE — DISCUSSION/SUMMARY
[de-identified] : Discussed all treatment options with patient, Patient wants to follow through with CSI injection today. Patient will follow up as needed.    RB&A to corticosteroid injection discussed. All questions were answered. Patient wishes to move forward with injection today.   Right Knee Ultrasound Guided aspiration/steroid injection procedure note: Patient Identification Name/: Verbal with patient and/or family   Procedure Verification: Procedure confirmed with patient or family/designee Consent for procedure: Verbal Consent Given Relevant documentation completed, reviewed, and signed Clinical indications for procedure confirmed  Time-out with all members of procedure team immediately prior to procedure: Correct patient identified. Agreement on procedure. Correct side and site.  KNEE INTRAARTICULAR INJECTION - RIGHT After verbal consent and identification of the correct patient and correct site, the RIGHT superolateral knee was prepped using alcohol swabs and betadine. This was allowed time to air dry.  A mixture of Kenalog,  Bupicacaine  was injected into the right knee using a sterile 22G 1.5 inch needle.  The patient tolerated the procedure well.  A sterile dressing was placed.  After-care instructions were provided and included instructions to ice the area and to call if redness, pain, or fever develop.   ----------------------------------------------- Home Exercise The patient is instructed on a home exercise program.  EDSON LANE Acting as a Scribe for Dr. Kayode MELARA, E[address] Lane, attest that this documentation has been prepared under the direction and in the presence of Provider Claude Headley MD.  Activity Modification The patient was advised to modify their activities.  Dx / Natural History The patient was advised of the diagnosis.  The natural history of the pathology was explained in full to the patient in layman's terms.  Several different treatment options were discussed and explained in full to the patient including the risks and benefits of both surgical and non-surgical treatments.  All questions and concerns were answered.  Pain Guide Activities The patient was advised to let pain guide the gradual advancement of activities.  MARIELLA MELARA explained to the patient that rest, ice, compression, and elevation would benefit them.  They may return to activity after follow-up or when they no longer have any pain.  The patient's current medication management of their orthopedic diagnosis was reviewed today: (1) We discussed a comprehensive treatment plan that included possible pharmaceutical management involving the use of prescription strength medications including but not limited to options such as oral Naprosyn 500mg BID, once daily Meloxicam 15 mg, or 500-650 mg Tylenol versus over the counter oral medications and topical prescription NSAID Pennsaid vs over the counter Voltaren gel. (2) There is a moderate risk of morbidity with further treatment, especially from use of prescription strength medications and possible side effects of these medications which include upset stomach with oral medications, skin reactions to topical medications and cardiac/renal issues with long term use. (3) I recommended that the patient follow-up with their medical physician to discuss any significant specific potential issues with long term medication use such as interactions with current medications or with exacerbation of underlying medical comorbidities. (4) The benefits and risks associated with use of injectable, oral or topical, prescription and over the counter anti-inflammatory medications were discussed with the patient. The patient voiced understanding of the risks including but not limited to bleeding, stroke, kidney dysfunction, heart disease, and were referred to the black box warning label for further information.

## 2024-08-05 ENCOUNTER — APPOINTMENT (OUTPATIENT)
Dept: FAMILY MEDICINE | Facility: CLINIC | Age: 69
End: 2024-08-05

## 2024-08-05 PROBLEM — J30.2 SEASONAL ALLERGIES: Status: ACTIVE | Noted: 2024-08-05

## 2024-08-05 PROCEDURE — 99214 OFFICE O/P EST MOD 30 MIN: CPT

## 2024-08-05 PROCEDURE — G2211 COMPLEX E/M VISIT ADD ON: CPT

## 2024-08-06 NOTE — HISTORY OF PRESENT ILLNESS
[FreeTextEntry1] : F/up [de-identified] : Dx Breast Ca with Bone MTS, treated at [facility] [place]> S/P Chemo 10/21/21 after 21 cycles.  She is now on Immunotherapy q 3 weeks. Annual PET scan . She was seen by Gyn, Dr JAKY Schulz in February for lumps felt in left breast and diagnosed with uterine fibroma (asymptomatic, no bleeding) She had removal in 3/22/21 which according to pt was malignant, now s/p total Hysterectomy on 6/10/21 at [facility]. She reports was told she if free of cancer. She has persistent dry cough and did very well with Breztri., mild benefits with Singulair. RT Knee pain post fall > 1 year ago> seen by orthopedic Dr Gifford> advise for Knee replacement. Recent Ortho eval 6/17/24. schedule. Seen by Endocrinology , Dr sherry Alonzo ([facility])for abnormal TSH and was advise no need for medication now.  States had 2nd dose of COVID vaccine, Moderna. Had flu shot on 10/07/21.

## 2024-08-06 NOTE — ASSESSMENT
[FreeTextEntry1] : Left breast Cancer with Bone MTS: -Seen at [facility] [place]. -S/P Chemo since 4/22/21> completed on 10/21/21 -On Immunotherapy q 3 weeks at [facility] [place] -PET scan annual at [facility]; -01/2023 -HIC/ HC screening 2021: negative  Gyn: Dr Kelvin HART.( Dr Schulz.in the past) Up to date. -S/P Fibroma resection 3/22/21 -S/P total Hysterectomy on 6/2021  Colonoscopy: 2015 at [facility]  DEXA: Normal  Immunizations: -COVID 3 doses Moderna.> -Prevnar 11/1/21 -PCV20 01/08/24  Chronic cough/ Hx asthma childhood -Continue Singulair -Doing very well on Breztri> not cover by insurance - Start Singulair. -Allergist referral.  Dermatosis: -Clotrimazole  Urinary Incontinence: -Advise for kegel exercises.  Obese: Counseling was provided in diet and exercise. TLC d/w pt in details, regarding benefits of low calories and portion control.  F/up for CPE in 6 months or prn.

## 2024-08-06 NOTE — HEALTH RISK ASSESSMENT
[No] : In the past 12 months have you used drugs other than those required for medical reasons? No [Never] : Never [de-identified] : Orthopedic

## 2024-08-06 NOTE — HISTORY OF PRESENT ILLNESS
[FreeTextEntry1] : F/up [de-identified] : Dx Breast Ca with Bone MTS, treated at [facility] [place]> S/P Chemo 10/21/21 after 21 cycles.  She is now on Immunotherapy q 3 weeks. Annual PET scan . She was seen by Gyn, Dr JAKY Schulz in February for lumps felt in left breast and diagnosed with uterine fibroma (asymptomatic, no bleeding) She had removal in 3/22/21 which according to pt was malignant, now s/p total Hysterectomy on 6/10/21 at [facility]. She reports was told she if free of cancer. She has persistent dry cough and did very well with Breztri., mild benefits with Singulair. RT Knee pain post fall > 1 year ago> seen by orthopedic Dr Gifford> advise for Knee replacement. Recent Ortho eval 6/17/24. schedule. Seen by Endocrinology , Dr sherry Alonzo ([facility])for abnormal TSH and was advise no need for medication now.  States had 2nd dose of COVID vaccine, Moderna. Had flu shot on 10/07/21.

## 2024-08-06 NOTE — ASSESSMENT
[FreeTextEntry1] : Left breast Cancer with Bone MTS: -Seen at [facility] [place]. -S/P Chemo since 4/22/21> completed on 10/21/21 -On Immunotherapy q 3 weeks at [facility] [place] -PET scan annual at [facility]; -01/2023 -HIC/ HC screening 2021: negative  Gyn: Dr Kelvin HART.( Dr Schulz.in the past) Up to date. -S/P Fibroma resection 3/22/21 -S/P total Hysterectomy on 6/2021  Colonoscopy: 2015 at [facility]  DEXA: Normal  Immunizations: -COVID 3 doses Moderna.> -Prevnar 11/1/21 -PCV20 01/08/24  Chronic cough/ Hx asthma childhood -Continue Singulair -Doing very well on Breztri> not cover by insurance - Start Singulair. -Allergist referral.  Dermatosis: -Clotrimazole  Urinary Incontinence: -Advise for kegel exercises.  Obese: Counseling was provided in diet and exercise. TLC d/w pt in details, regarding benefits of low calories and portion control.  F/up for CPE in 6 months or prn.

## 2024-08-06 NOTE — HEALTH RISK ASSESSMENT
[No] : In the past 12 months have you used drugs other than those required for medical reasons? No [Never] : Never [de-identified] : Orthopedic

## 2024-08-07 ENCOUNTER — NON-APPOINTMENT (OUTPATIENT)
Age: 69
End: 2024-08-07

## 2024-08-07 ENCOUNTER — RX CHANGE (OUTPATIENT)
Age: 69
End: 2024-08-07

## 2024-08-29 ENCOUNTER — RX RENEWAL (OUTPATIENT)
Age: 69
End: 2024-08-29

## 2024-09-03 ENCOUNTER — RX RENEWAL (OUTPATIENT)
Age: 69
End: 2024-09-03

## 2024-09-05 RX ORDER — AZELASTINE HYDROCHLORIDE 137 UG/1
137 SPRAY, METERED NASAL
Qty: 1 | Refills: 0 | Status: ACTIVE | COMMUNITY
Start: 2024-09-03 | End: 1900-01-01

## 2024-09-16 ENCOUNTER — LABORATORY RESULT (OUTPATIENT)
Age: 69
End: 2024-09-16

## 2024-09-16 ENCOUNTER — APPOINTMENT (OUTPATIENT)
Dept: FAMILY MEDICINE | Facility: CLINIC | Age: 69
End: 2024-09-16
Payer: MEDICARE

## 2024-09-16 VITALS
TEMPERATURE: 97.6 F | DIASTOLIC BLOOD PRESSURE: 82 MMHG | OXYGEN SATURATION: 96 % | RESPIRATION RATE: 14 BRPM | HEART RATE: 78 BPM | BODY MASS INDEX: 29.84 KG/M2 | HEIGHT: 60 IN | WEIGHT: 152 LBS | SYSTOLIC BLOOD PRESSURE: 128 MMHG

## 2024-09-16 DIAGNOSIS — J30.2 OTHER SEASONAL ALLERGIC RHINITIS: ICD-10-CM

## 2024-09-16 DIAGNOSIS — N39.0 URINARY TRACT INFECTION, SITE NOT SPECIFIED: ICD-10-CM

## 2024-09-16 PROCEDURE — 99213 OFFICE O/P EST LOW 20 MIN: CPT

## 2024-09-16 PROCEDURE — G2211 COMPLEX E/M VISIT ADD ON: CPT

## 2024-09-16 PROCEDURE — 81003 URINALYSIS AUTO W/O SCOPE: CPT | Mod: QW

## 2024-09-16 RX ORDER — NITROFURANTOIN MACROCRYSTALS 100 MG/1
100 CAPSULE ORAL
Qty: 20 | Refills: 0 | Status: ACTIVE | COMMUNITY
Start: 2024-09-16 | End: 1900-01-01

## 2024-09-16 NOTE — HISTORY OF PRESENT ILLNESS
[FreeTextEntry8] : Pt c/o 2 days with dysuria and pain when voiding and frequency. No fever States had OTC AZO with mild benefits. States had similar symptoms 2 weeks ago that resolved completely with AZO and not this time.  Breast Ca with Bone MTS, treated at [facility] [place]> S/P Chemo 10/21/21 after 21 cycles.  She is now on Immunotherapy q 3 weeks. Annual PET scan. She was seen by Gyn, Dr JAKY Schulz in February for lumps felt in left breast and diagnosed with uterine fibroma (asymptomatic, no bleeding) She had removal in 3/22/21 which according to pt was malignant, now s/p total Hysterectomy on 6/10/21 at [facility]. She reports was told she if free of cancer.

## 2024-09-16 NOTE — ASSESSMENT
[FreeTextEntry1] :  # Acute UTI -POCT UA + nit and Leuk -Start Macrobid -UA w/ cx order  Left breast Cancer with Bone MTS: [place] [facility]S/P Chemo since 4/22/21> completed on 10/21/21 -[place] [facility] [place] [facility]; -01/2023 -HIC/ HC screening 2021: negative  Gyn: Dr Kelvin HART.( Dr Schulz.in the past) Up to date. -S/P Fibroma resection 3/22/21 -S/P total Hysterectomy on 6/2021  Colonoscopy: 2015 at [facility]  DEXA: Normal  Immunizations: -COVID 3 doses Moderna.> -Prevnar 11/1/21 -PCV20 01/08/24  Urinary Incontinence: -Advise for kegel exercises.  Obese: Counseling was provided in diet and exercise. TLC d/w pt in details, regarding benefits of low calories and portion control.  F/up for CPE in 6 months or prn.

## 2024-09-17 LAB
APPEARANCE: ABNORMAL
BILIRUBIN URINE: ABNORMAL
BLOOD URINE: ABNORMAL
COLOR: ABNORMAL
GLUCOSE QUALITATIVE U: NEGATIVE MG/DL
KETONES URINE: NEGATIVE MG/DL
LEUKOCYTE ESTERASE URINE: ABNORMAL
NITRITE URINE: POSITIVE
PH URINE: 6
PROTEIN URINE: 100 MG/DL
SPECIFIC GRAVITY URINE: 1.03
UROBILINOGEN URINE: 1 MG/DL

## 2024-10-21 ENCOUNTER — APPOINTMENT (OUTPATIENT)
Dept: ORTHOPEDIC SURGERY | Facility: CLINIC | Age: 69
End: 2024-10-21
Payer: MEDICARE

## 2024-10-21 VITALS — WEIGHT: 152 LBS | BODY MASS INDEX: 29.84 KG/M2 | HEIGHT: 60 IN

## 2024-10-21 DIAGNOSIS — S89.91XS UNSPECIFIED INJURY OF RIGHT LOWER LEG, SEQUELA: ICD-10-CM

## 2024-10-21 DIAGNOSIS — M79.18 MYALGIA, OTHER SITE: ICD-10-CM

## 2024-10-21 PROCEDURE — 99214 OFFICE O/P EST MOD 30 MIN: CPT

## 2024-10-28 ENCOUNTER — RX RENEWAL (OUTPATIENT)
Age: 69
End: 2024-10-28

## 2024-10-29 ENCOUNTER — APPOINTMENT (OUTPATIENT)
Dept: ORTHOPEDIC SURGERY | Facility: CLINIC | Age: 69
End: 2024-10-29
Payer: MEDICARE

## 2024-10-29 VITALS — WEIGHT: 154 LBS | BODY MASS INDEX: 30.23 KG/M2 | HEIGHT: 60 IN

## 2024-10-29 PROCEDURE — L1833: CPT | Mod: RT

## 2024-10-29 PROCEDURE — 99214 OFFICE O/P EST MOD 30 MIN: CPT

## 2024-10-29 PROCEDURE — 73564 X-RAY EXAM KNEE 4 OR MORE: CPT | Mod: RT

## 2024-11-01 ENCOUNTER — RESULT REVIEW (OUTPATIENT)
Age: 69
End: 2024-11-01

## 2024-11-05 ENCOUNTER — APPOINTMENT (OUTPATIENT)
Dept: ORTHOPEDIC SURGERY | Facility: CLINIC | Age: 69
End: 2024-11-05
Payer: MEDICARE

## 2024-11-05 VITALS — WEIGHT: 154 LBS | HEIGHT: 60 IN | BODY MASS INDEX: 30.23 KG/M2

## 2024-11-05 DIAGNOSIS — M17.11 UNILATERAL PRIMARY OSTEOARTHRITIS, RIGHT KNEE: ICD-10-CM

## 2024-11-05 PROCEDURE — 99214 OFFICE O/P EST MOD 30 MIN: CPT

## 2024-11-26 ENCOUNTER — APPOINTMENT (OUTPATIENT)
Dept: FAMILY MEDICINE | Facility: CLINIC | Age: 69
End: 2024-11-26
Payer: MEDICARE

## 2024-11-26 VITALS
DIASTOLIC BLOOD PRESSURE: 82 MMHG | WEIGHT: 152 LBS | SYSTOLIC BLOOD PRESSURE: 148 MMHG | TEMPERATURE: 98.2 F | BODY MASS INDEX: 29.84 KG/M2 | HEART RATE: 82 BPM | OXYGEN SATURATION: 98 % | RESPIRATION RATE: 16 BRPM | HEIGHT: 60 IN

## 2024-11-26 DIAGNOSIS — Z01.818 ENCOUNTER FOR OTHER PREPROCEDURAL EXAMINATION: ICD-10-CM

## 2024-11-26 DIAGNOSIS — J34.89 OTHER SPECIFIED DISORDERS OF NOSE AND NASAL SINUSES: ICD-10-CM

## 2024-11-26 DIAGNOSIS — M17.11 UNILATERAL PRIMARY OSTEOARTHRITIS, RIGHT KNEE: ICD-10-CM

## 2024-11-26 PROCEDURE — 99214 OFFICE O/P EST MOD 30 MIN: CPT

## 2024-12-02 ENCOUNTER — APPOINTMENT (OUTPATIENT)
Dept: ORTHOPEDIC SURGERY | Facility: HOSPITAL | Age: 69
End: 2024-12-02
Payer: MEDICARE

## 2024-12-02 PROCEDURE — 27447 TOTAL KNEE ARTHROPLASTY: CPT | Mod: AS,RT

## 2024-12-02 PROCEDURE — 27447 TOTAL KNEE ARTHROPLASTY: CPT | Mod: RT

## 2024-12-02 PROCEDURE — 20610 DRAIN/INJ JOINT/BURSA W/O US: CPT | Mod: 59,RT

## 2024-12-09 ENCOUNTER — RX RENEWAL (OUTPATIENT)
Age: 69
End: 2024-12-09

## 2024-12-10 ENCOUNTER — APPOINTMENT (OUTPATIENT)
Dept: FAMILY MEDICINE | Facility: CLINIC | Age: 69
End: 2024-12-10

## 2024-12-12 ENCOUNTER — APPOINTMENT (OUTPATIENT)
Dept: ORTHOPEDIC SURGERY | Facility: CLINIC | Age: 69
End: 2024-12-12
Payer: MEDICARE

## 2024-12-12 DIAGNOSIS — M25.561 PAIN IN RIGHT KNEE: ICD-10-CM

## 2024-12-12 DIAGNOSIS — M79.18 MYALGIA, OTHER SITE: ICD-10-CM

## 2024-12-12 DIAGNOSIS — E78.00 PURE HYPERCHOLESTEROLEMIA, UNSPECIFIED: ICD-10-CM

## 2024-12-12 DIAGNOSIS — Z96.651 PRESENCE OF RIGHT ARTIFICIAL KNEE JOINT: ICD-10-CM

## 2024-12-12 PROCEDURE — 73560 X-RAY EXAM OF KNEE 1 OR 2: CPT | Mod: RT

## 2024-12-12 PROCEDURE — 99024 POSTOP FOLLOW-UP VISIT: CPT

## 2024-12-12 RX ORDER — OXYCODONE 5 MG/1
5 TABLET ORAL
Qty: 42 | Refills: 0 | Status: ACTIVE | COMMUNITY
Start: 2024-12-03

## 2024-12-12 RX ORDER — MUPIROCIN 20 MG/G
2 OINTMENT TOPICAL
Qty: 22 | Refills: 0 | Status: ACTIVE | COMMUNITY
Start: 2024-11-20

## 2024-12-12 RX ORDER — APIXABAN 2.5 MG/1
2.5 TABLET, FILM COATED ORAL
Qty: 60 | Refills: 0 | Status: ACTIVE | COMMUNITY
Start: 2024-12-03

## 2024-12-12 RX ORDER — MELOXICAM 15 MG/1
15 TABLET ORAL
Qty: 30 | Refills: 0 | Status: ACTIVE | COMMUNITY
Start: 2024-05-28

## 2024-12-17 NOTE — ASU PREOP CHECKLIST - DENTURES
no
(3) Alterations in Oxygenation (Respiratory Diagnosis, Dehydration, Anemia, Anorexia, Syncope/Dizziness, etc.)

## 2025-01-09 ENCOUNTER — APPOINTMENT (OUTPATIENT)
Dept: ORTHOPEDIC SURGERY | Facility: CLINIC | Age: 70
End: 2025-01-09
Payer: MEDICARE

## 2025-01-09 DIAGNOSIS — Z96.651 PRESENCE OF RIGHT ARTIFICIAL KNEE JOINT: ICD-10-CM

## 2025-01-09 PROCEDURE — 99024 POSTOP FOLLOW-UP VISIT: CPT

## 2025-01-09 PROCEDURE — 73562 X-RAY EXAM OF KNEE 3: CPT | Mod: RT

## 2025-02-13 ENCOUNTER — APPOINTMENT (OUTPATIENT)
Dept: ORTHOPEDIC SURGERY | Facility: CLINIC | Age: 70
End: 2025-02-13
Payer: MEDICARE

## 2025-02-13 DIAGNOSIS — Z96.651 PRESENCE OF RIGHT ARTIFICIAL KNEE JOINT: ICD-10-CM

## 2025-02-13 DIAGNOSIS — M17.11 UNILATERAL PRIMARY OSTEOARTHRITIS, RIGHT KNEE: ICD-10-CM

## 2025-02-13 PROCEDURE — 99024 POSTOP FOLLOW-UP VISIT: CPT

## 2025-02-13 PROCEDURE — 73564 X-RAY EXAM KNEE 4 OR MORE: CPT | Mod: RT

## 2025-04-19 ENCOUNTER — NON-APPOINTMENT (OUTPATIENT)
Age: 70
End: 2025-04-19

## 2025-04-21 ENCOUNTER — APPOINTMENT (OUTPATIENT)
Dept: FAMILY MEDICINE | Facility: CLINIC | Age: 70
End: 2025-04-21
Payer: MEDICARE

## 2025-04-21 ENCOUNTER — LABORATORY RESULT (OUTPATIENT)
Age: 70
End: 2025-04-21

## 2025-04-21 VITALS
WEIGHT: 142 LBS | BODY MASS INDEX: 27.88 KG/M2 | OXYGEN SATURATION: 98 % | TEMPERATURE: 97.4 F | SYSTOLIC BLOOD PRESSURE: 124 MMHG | RESPIRATION RATE: 15 BRPM | HEIGHT: 60 IN | HEART RATE: 67 BPM | DIASTOLIC BLOOD PRESSURE: 74 MMHG

## 2025-04-21 DIAGNOSIS — Z12.11 ENCOUNTER FOR SCREENING FOR MALIGNANT NEOPLASM OF COLON: ICD-10-CM

## 2025-04-21 DIAGNOSIS — Z00.00 ENCOUNTER FOR GENERAL ADULT MEDICAL EXAMINATION W/OUT ABNORMAL FINDINGS: ICD-10-CM

## 2025-04-21 DIAGNOSIS — R32 UNSPECIFIED URINARY INCONTINENCE: ICD-10-CM

## 2025-04-21 PROCEDURE — G0439: CPT

## 2025-04-21 PROCEDURE — 36415 COLL VENOUS BLD VENIPUNCTURE: CPT

## 2025-04-21 RX ORDER — NITROFURANTOIN (MONOHYDRATE/MACROCRYSTALS) 25; 75 MG/1; MG/1
100 CAPSULE ORAL
Qty: 14 | Refills: 0 | Status: COMPLETED | COMMUNITY
Start: 2025-01-18

## 2025-04-21 RX ORDER — SULFAMETHOXAZOLE AND TRIMETHOPRIM 800; 160 MG/1; MG/1
800-160 TABLET ORAL
Qty: 10 | Refills: 0 | Status: DISCONTINUED | COMMUNITY
Start: 2024-12-26

## 2025-04-21 RX ORDER — TRIAMCINOLONE ACETONIDE 1 MG/G
0.1 CREAM TOPICAL
Qty: 454 | Refills: 0 | Status: DISCONTINUED | COMMUNITY
Start: 2025-02-05

## 2025-04-23 LAB
25(OH)D3 SERPL-MCNC: 75 NG/ML
ALBUMIN SERPL ELPH-MCNC: 4.3 G/DL
ALP BLD-CCNC: 228 U/L
ALT SERPL-CCNC: 12 U/L
ANION GAP SERPL CALC-SCNC: 13 MMOL/L
APPEARANCE: ABNORMAL
AST SERPL-CCNC: 18 U/L
BASOPHILS # BLD AUTO: 0.05 K/UL
BASOPHILS NFR BLD AUTO: 0.9 %
BILIRUB SERPL-MCNC: 0.3 MG/DL
BILIRUBIN URINE: NEGATIVE
BLOOD URINE: ABNORMAL
BUN SERPL-MCNC: 9 MG/DL
CALCIUM SERPL-MCNC: 9.8 MG/DL
CHLORIDE SERPL-SCNC: 104 MMOL/L
CHOLEST SERPL-MCNC: 137 MG/DL
CO2 SERPL-SCNC: 26 MMOL/L
COLOR: YELLOW
CREAT SERPL-MCNC: 0.73 MG/DL
EGFRCR SERPLBLD CKD-EPI 2021: 88 ML/MIN/1.73M2
EOSINOPHIL # BLD AUTO: 0.12 K/UL
EOSINOPHIL NFR BLD AUTO: 2.2 %
GLUCOSE QUALITATIVE U: NEGATIVE MG/DL
GLUCOSE SERPL-MCNC: 100 MG/DL
HCT VFR BLD CALC: 37.7 %
HDLC SERPL-MCNC: 56 MG/DL
HGB BLD-MCNC: 10.8 G/DL
IMM GRANULOCYTES NFR BLD AUTO: 0.2 %
KETONES URINE: NEGATIVE MG/DL
LDLC SERPL-MCNC: 62 MG/DL
LEUKOCYTE ESTERASE URINE: ABNORMAL
LYMPHOCYTES # BLD AUTO: 1.22 K/UL
LYMPHOCYTES NFR BLD AUTO: 22 %
MAN DIFF?: NORMAL
MCHC RBC-ENTMCNC: 24.7 PG
MCHC RBC-ENTMCNC: 28.6 G/DL
MCV RBC AUTO: 86.1 FL
MONOCYTES # BLD AUTO: 0.4 K/UL
MONOCYTES NFR BLD AUTO: 7.2 %
NEUTROPHILS # BLD AUTO: 3.74 K/UL
NEUTROPHILS NFR BLD AUTO: 67.5 %
NITRITE URINE: NEGATIVE
NONHDLC SERPL-MCNC: 81 MG/DL
PH URINE: 6
PLATELET # BLD AUTO: 378 K/UL
POTASSIUM SERPL-SCNC: 4.7 MMOL/L
PROT SERPL-MCNC: 6.7 G/DL
PROTEIN URINE: NORMAL MG/DL
RBC # BLD: 4.38 M/UL
RBC # FLD: 17.4 %
SODIUM SERPL-SCNC: 143 MMOL/L
SPECIFIC GRAVITY URINE: 1.01
TRIGL SERPL-MCNC: 104 MG/DL
TSH SERPL-ACNC: 0.02 UIU/ML
UROBILINOGEN URINE: 0.2 MG/DL
WBC # FLD AUTO: 5.54 K/UL

## 2025-04-30 ENCOUNTER — APPOINTMENT (OUTPATIENT)
Dept: RADIOLOGY | Facility: CLINIC | Age: 70
End: 2025-04-30

## 2025-04-30 ENCOUNTER — OUTPATIENT (OUTPATIENT)
Dept: OUTPATIENT SERVICES | Facility: HOSPITAL | Age: 70
LOS: 1 days | End: 2025-04-30
Payer: COMMERCIAL

## 2025-04-30 DIAGNOSIS — Z00.00 ENCOUNTER FOR GENERAL ADULT MEDICAL EXAMINATION WITHOUT ABNORMAL FINDINGS: ICD-10-CM

## 2025-04-30 DIAGNOSIS — Z98.890 OTHER SPECIFIED POSTPROCEDURAL STATES: Chronic | ICD-10-CM

## 2025-04-30 PROCEDURE — 77080 DXA BONE DENSITY AXIAL: CPT

## 2025-04-30 PROCEDURE — 77080 DXA BONE DENSITY AXIAL: CPT | Mod: 26

## 2025-05-05 ENCOUNTER — NON-APPOINTMENT (OUTPATIENT)
Age: 70
End: 2025-05-05

## 2025-05-15 ENCOUNTER — APPOINTMENT (OUTPATIENT)
Dept: ORTHOPEDIC SURGERY | Facility: CLINIC | Age: 70
End: 2025-05-15

## 2025-05-15 DIAGNOSIS — Z96.651 PRESENCE OF RIGHT ARTIFICIAL KNEE JOINT: ICD-10-CM

## 2025-05-15 DIAGNOSIS — M17.11 UNILATERAL PRIMARY OSTEOARTHRITIS, RIGHT KNEE: ICD-10-CM

## 2025-05-15 DIAGNOSIS — Z78.9 OTHER SPECIFIED HEALTH STATUS: ICD-10-CM

## 2025-05-15 PROCEDURE — 73562 X-RAY EXAM OF KNEE 3: CPT | Mod: RT

## 2025-05-15 PROCEDURE — 99214 OFFICE O/P EST MOD 30 MIN: CPT

## 2025-05-15 RX ORDER — AMOXICILLIN 500 MG/1
500 CAPSULE ORAL
Qty: 12 | Refills: 1 | Status: ACTIVE | COMMUNITY
Start: 2025-05-15 | End: 1900-01-01

## 2025-06-19 ENCOUNTER — APPOINTMENT (OUTPATIENT)
Dept: UROGYNECOLOGY | Facility: CLINIC | Age: 70
End: 2025-06-19

## 2025-06-19 VITALS
SYSTOLIC BLOOD PRESSURE: 120 MMHG | DIASTOLIC BLOOD PRESSURE: 80 MMHG | WEIGHT: 142 LBS | HEART RATE: 70 BPM | BODY MASS INDEX: 27.88 KG/M2 | HEIGHT: 60 IN

## 2025-06-19 PROBLEM — R35.1 NOCTURIA: Status: ACTIVE | Noted: 2025-06-19

## 2025-06-19 PROBLEM — Z86.39 HISTORY OF HIGH CHOLESTEROL: Status: RESOLVED | Noted: 2025-06-19 | Resolved: 2025-06-19

## 2025-06-19 PROBLEM — N39.41 URGE INCONTINENCE OF URINE: Status: ACTIVE | Noted: 2025-06-19

## 2025-06-19 PROBLEM — R39.15 URINARY URGENCY: Status: ACTIVE | Noted: 2025-06-19

## 2025-06-19 PROBLEM — Z85.9 HISTORY OF MALIGNANT NEOPLASM: Status: RESOLVED | Noted: 2025-06-19 | Resolved: 2025-06-19

## 2025-06-19 PROBLEM — Z87.828 HISTORY OF BACK INJURY: Status: RESOLVED | Noted: 2025-06-19 | Resolved: 2025-06-19

## 2025-06-19 PROBLEM — Z86.73 HISTORY OF CEREBROVASCULAR ACCIDENT: Status: RESOLVED | Noted: 2025-06-19 | Resolved: 2025-06-19

## 2025-06-19 PROBLEM — Z85.3 HISTORY OF MALIGNANT NEOPLASM OF FEMALE BREAST: Status: RESOLVED | Noted: 2025-06-19 | Resolved: 2025-06-19

## 2025-06-19 PROBLEM — N95.2 VAGINAL ATROPHY: Status: ACTIVE | Noted: 2025-06-19

## 2025-06-19 PROBLEM — Z63.5 DIVORCED: Status: ACTIVE | Noted: 2025-06-19

## 2025-06-19 LAB
BILIRUB UR QL STRIP: NEGATIVE
CLARITY UR: CLEAR
COLLECTION METHOD: NORMAL
GLUCOSE UR-MCNC: NEGATIVE
HCG UR QL: 0.2 EU/DL
HGB UR QL STRIP.AUTO: NEGATIVE
KETONES UR-MCNC: NEGATIVE
LEUKOCYTE ESTERASE UR QL STRIP: NEGATIVE
NITRITE UR QL STRIP: NEGATIVE
PH UR STRIP: 7
PROT UR STRIP-MCNC: NEGATIVE
SP GR UR STRIP: 1.01

## 2025-06-19 PROCEDURE — 99459 PELVIC EXAMINATION: CPT

## 2025-06-19 PROCEDURE — 81003 URINALYSIS AUTO W/O SCOPE: CPT | Mod: QW

## 2025-06-19 PROCEDURE — 99204 OFFICE O/P NEW MOD 45 MIN: CPT | Mod: 25

## 2025-06-19 PROCEDURE — 51701 INSERT BLADDER CATHETER: CPT

## 2025-07-21 ENCOUNTER — APPOINTMENT (OUTPATIENT)
Dept: UROGYNECOLOGY | Facility: CLINIC | Age: 70
End: 2025-07-21
Payer: MEDICARE

## 2025-07-21 VITALS
DIASTOLIC BLOOD PRESSURE: 80 MMHG | WEIGHT: 143 LBS | HEART RATE: 87 BPM | SYSTOLIC BLOOD PRESSURE: 145 MMHG | HEIGHT: 60 IN | BODY MASS INDEX: 28.07 KG/M2

## 2025-07-21 DIAGNOSIS — Z46.89 ENCOUNTER FOR FITTING AND ADJUSTMENT OF OTHER SPECIFIED DEVICES: ICD-10-CM

## 2025-07-21 DIAGNOSIS — N81.6 RECTOCELE: ICD-10-CM

## 2025-07-21 PROCEDURE — A4562: CPT

## 2025-07-21 PROCEDURE — 57160 INSERT PESSARY/OTHER DEVICE: CPT

## 2025-08-01 ENCOUNTER — APPOINTMENT (OUTPATIENT)
Dept: UROGYNECOLOGY | Facility: CLINIC | Age: 70
End: 2025-08-01
Payer: MEDICARE

## 2025-08-01 PROCEDURE — 99213 OFFICE O/P EST LOW 20 MIN: CPT

## 2025-08-18 ENCOUNTER — APPOINTMENT (OUTPATIENT)
Dept: UROGYNECOLOGY | Facility: CLINIC | Age: 70
End: 2025-08-18
Payer: MEDICARE

## 2025-08-18 VITALS
HEART RATE: 86 BPM | HEIGHT: 60 IN | BODY MASS INDEX: 28.07 KG/M2 | DIASTOLIC BLOOD PRESSURE: 69 MMHG | SYSTOLIC BLOOD PRESSURE: 111 MMHG | WEIGHT: 143 LBS

## 2025-08-18 PROBLEM — N39.3 STRESS INCONTINENCE, FEMALE: Status: ACTIVE | Noted: 2025-06-19

## 2025-08-18 PROCEDURE — 51797 INTRAABDOMINAL PRESSURE TEST: CPT

## 2025-08-18 PROCEDURE — 51729 CYSTOMETROGRAM W/VP&UP: CPT

## 2025-08-18 PROCEDURE — 51741 ELECTRO-UROFLOWMETRY FIRST: CPT

## 2025-08-18 PROCEDURE — 51784 ANAL/URINARY MUSCLE STUDY: CPT

## 2025-08-20 ENCOUNTER — APPOINTMENT (OUTPATIENT)
Dept: UROGYNECOLOGY | Facility: CLINIC | Age: 70
End: 2025-08-20
Payer: MEDICARE

## 2025-08-20 DIAGNOSIS — N95.2 POSTMENOPAUSAL ATROPHIC VAGINITIS: ICD-10-CM

## 2025-08-20 PROCEDURE — 99214 OFFICE O/P EST MOD 30 MIN: CPT

## 2025-08-28 ENCOUNTER — APPOINTMENT (OUTPATIENT)
Dept: UROGYNECOLOGY | Facility: CLINIC | Age: 70
End: 2025-08-28
Payer: MEDICARE

## 2025-08-28 VITALS
SYSTOLIC BLOOD PRESSURE: 120 MMHG | HEART RATE: 78 BPM | DIASTOLIC BLOOD PRESSURE: 83 MMHG | BODY MASS INDEX: 28.07 KG/M2 | WEIGHT: 143 LBS | HEIGHT: 60 IN

## 2025-08-28 DIAGNOSIS — N39.3 STRESS INCONTINENCE (FEMALE) (MALE): ICD-10-CM

## 2025-08-28 DIAGNOSIS — R39.15 URGENCY OF URINATION: ICD-10-CM

## 2025-08-28 DIAGNOSIS — R35.1 NOCTURIA: ICD-10-CM

## 2025-08-28 LAB
BILIRUB UR QL STRIP: NEGATIVE
CLARITY UR: CLEAR
COLLECTION METHOD: NORMAL
GLUCOSE UR-MCNC: NEGATIVE
HCG UR QL: 0.2 EU/DL
HGB UR QL STRIP.AUTO: NEGATIVE
KETONES UR-MCNC: NEGATIVE
LEUKOCYTE ESTERASE UR QL STRIP: NEGATIVE
NITRITE UR QL STRIP: NEGATIVE
PH UR STRIP: 6
PROT UR STRIP-MCNC: NEGATIVE
SP GR UR STRIP: 1.01

## 2025-08-28 PROCEDURE — 52000 CYSTOURETHROSCOPY: CPT

## 2025-08-28 PROCEDURE — 81003 URINALYSIS AUTO W/O SCOPE: CPT | Mod: QW

## 2025-08-29 ENCOUNTER — OUTPATIENT (OUTPATIENT)
Dept: OUTPATIENT SERVICES | Facility: HOSPITAL | Age: 70
LOS: 1 days | End: 2025-08-29
Payer: COMMERCIAL

## 2025-08-29 VITALS
RESPIRATION RATE: 16 BRPM | OXYGEN SATURATION: 97 % | SYSTOLIC BLOOD PRESSURE: 122 MMHG | DIASTOLIC BLOOD PRESSURE: 80 MMHG | HEART RATE: 80 BPM | HEIGHT: 60 IN | TEMPERATURE: 98 F | WEIGHT: 145.51 LBS

## 2025-08-29 DIAGNOSIS — Z98.890 OTHER SPECIFIED POSTPROCEDURAL STATES: Chronic | ICD-10-CM

## 2025-08-29 DIAGNOSIS — Z02.83 ENCOUNTER FOR BLOOD-ALCOHOL AND BLOOD-DRUG TEST: ICD-10-CM

## 2025-08-29 DIAGNOSIS — N39.3 STRESS INCONTINENCE (FEMALE) (MALE): ICD-10-CM

## 2025-08-29 DIAGNOSIS — Z90.710 ACQUIRED ABSENCE OF BOTH CERVIX AND UTERUS: Chronic | ICD-10-CM

## 2025-08-29 DIAGNOSIS — Z01.818 ENCOUNTER FOR OTHER PREPROCEDURAL EXAMINATION: ICD-10-CM

## 2025-08-29 DIAGNOSIS — Z29.9 ENCOUNTER FOR PROPHYLACTIC MEASURES, UNSPECIFIED: ICD-10-CM

## 2025-08-29 DIAGNOSIS — Z96.651 PRESENCE OF RIGHT ARTIFICIAL KNEE JOINT: Chronic | ICD-10-CM

## 2025-08-29 DIAGNOSIS — C50.919 MALIGNANT NEOPLASM OF UNSPECIFIED SITE OF UNSPECIFIED FEMALE BREAST: ICD-10-CM

## 2025-08-29 DIAGNOSIS — Z98.51 TUBAL LIGATION STATUS: Chronic | ICD-10-CM

## 2025-08-29 DIAGNOSIS — C50.919 MALIGNANT NEOPLASM OF UNSPECIFIED SITE OF UNSPECIFIED FEMALE BREAST: Chronic | ICD-10-CM

## 2025-08-29 LAB
A1C WITH ESTIMATED AVERAGE GLUCOSE RESULT: 5.7 % — HIGH (ref 4–5.6)
ALBUMIN SERPL ELPH-MCNC: 4.2 G/DL — SIGNIFICANT CHANGE UP (ref 3.3–5.2)
ALP SERPL-CCNC: 219 U/L — HIGH (ref 40–120)
ALT FLD-CCNC: 16 U/L — SIGNIFICANT CHANGE UP
ANION GAP SERPL CALC-SCNC: 11 MMOL/L — SIGNIFICANT CHANGE UP (ref 5–17)
APPEARANCE UR: CLEAR — SIGNIFICANT CHANGE UP
AST SERPL-CCNC: 19 U/L — SIGNIFICANT CHANGE UP
BASOPHILS # BLD AUTO: 0.02 K/UL — SIGNIFICANT CHANGE UP (ref 0–0.2)
BASOPHILS NFR BLD AUTO: 0.4 % — SIGNIFICANT CHANGE UP (ref 0–2)
BILIRUB SERPL-MCNC: 0.3 MG/DL — LOW (ref 0.4–2)
BILIRUB UR-MCNC: NEGATIVE — SIGNIFICANT CHANGE UP
BLD GP AB SCN SERPL QL: SIGNIFICANT CHANGE UP
BUN SERPL-MCNC: 14.3 MG/DL — SIGNIFICANT CHANGE UP (ref 8–20)
CALCIUM SERPL-MCNC: 10 MG/DL — SIGNIFICANT CHANGE UP (ref 8.4–10.5)
CHLORIDE SERPL-SCNC: 103 MMOL/L — SIGNIFICANT CHANGE UP (ref 96–108)
CO2 SERPL-SCNC: 27 MMOL/L — SIGNIFICANT CHANGE UP (ref 22–29)
COLOR SPEC: YELLOW — SIGNIFICANT CHANGE UP
CREAT SERPL-MCNC: 0.67 MG/DL — SIGNIFICANT CHANGE UP (ref 0.5–1.3)
DIFF PNL FLD: NEGATIVE — SIGNIFICANT CHANGE UP
EGFR: 94 ML/MIN/1.73M2 — SIGNIFICANT CHANGE UP
EGFR: 94 ML/MIN/1.73M2 — SIGNIFICANT CHANGE UP
EOSINOPHIL # BLD AUTO: 0.1 K/UL — SIGNIFICANT CHANGE UP (ref 0–0.5)
EOSINOPHIL NFR BLD AUTO: 2.2 % — SIGNIFICANT CHANGE UP (ref 0–6)
ESTIMATED AVERAGE GLUCOSE: 117 MG/DL — HIGH (ref 68–114)
GLUCOSE SERPL-MCNC: 107 MG/DL — HIGH (ref 70–99)
GLUCOSE UR QL: NEGATIVE MG/DL — SIGNIFICANT CHANGE UP
HCT VFR BLD CALC: 38.2 % — SIGNIFICANT CHANGE UP (ref 34.5–45)
HGB BLD-MCNC: 11.7 G/DL — SIGNIFICANT CHANGE UP (ref 11.5–15.5)
IMM GRANULOCYTES # BLD AUTO: 0.01 K/UL — SIGNIFICANT CHANGE UP (ref 0–0.07)
IMM GRANULOCYTES NFR BLD AUTO: 0.2 % — SIGNIFICANT CHANGE UP (ref 0–0.9)
KETONES UR QL: NEGATIVE MG/DL — SIGNIFICANT CHANGE UP
LEUKOCYTE ESTERASE UR-ACNC: NEGATIVE — SIGNIFICANT CHANGE UP
LYMPHOCYTES # BLD AUTO: 1.01 K/UL — SIGNIFICANT CHANGE UP (ref 1–3.3)
LYMPHOCYTES NFR BLD AUTO: 22.2 % — SIGNIFICANT CHANGE UP (ref 13–44)
MCHC RBC-ENTMCNC: 24.9 PG — LOW (ref 27–34)
MCHC RBC-ENTMCNC: 30.6 G/DL — LOW (ref 32–36)
MCV RBC AUTO: 81.4 FL — SIGNIFICANT CHANGE UP (ref 80–100)
MONOCYTES # BLD AUTO: 0.36 K/UL — SIGNIFICANT CHANGE UP (ref 0–0.9)
MONOCYTES NFR BLD AUTO: 7.9 % — SIGNIFICANT CHANGE UP (ref 2–14)
NEUTROPHILS # BLD AUTO: 3.04 K/UL — SIGNIFICANT CHANGE UP (ref 1.8–7.4)
NEUTROPHILS NFR BLD AUTO: 67.1 % — SIGNIFICANT CHANGE UP (ref 43–77)
NITRITE UR-MCNC: NEGATIVE — SIGNIFICANT CHANGE UP
NRBC # BLD AUTO: 0 K/UL — SIGNIFICANT CHANGE UP (ref 0–0)
NRBC # FLD: 0 K/UL — SIGNIFICANT CHANGE UP (ref 0–0)
NRBC BLD AUTO-RTO: 0 /100 WBCS — SIGNIFICANT CHANGE UP (ref 0–0)
PH UR: 8 — SIGNIFICANT CHANGE UP (ref 5–8)
PLATELET # BLD AUTO: 384 K/UL — SIGNIFICANT CHANGE UP (ref 150–400)
PMV BLD: 10.8 FL — SIGNIFICANT CHANGE UP (ref 7–13)
POTASSIUM SERPL-MCNC: 4.7 MMOL/L — SIGNIFICANT CHANGE UP (ref 3.5–5.3)
POTASSIUM SERPL-SCNC: 4.7 MMOL/L — SIGNIFICANT CHANGE UP (ref 3.5–5.3)
PROT SERPL-MCNC: 7.5 G/DL — SIGNIFICANT CHANGE UP (ref 6.6–8.7)
PROT UR-MCNC: NEGATIVE MG/DL — SIGNIFICANT CHANGE UP
RBC # BLD: 4.69 M/UL — SIGNIFICANT CHANGE UP (ref 3.8–5.2)
RBC # FLD: 16.4 % — HIGH (ref 10.3–14.5)
SODIUM SERPL-SCNC: 141 MMOL/L — SIGNIFICANT CHANGE UP (ref 135–145)
SP GR SPEC: 1.01 — SIGNIFICANT CHANGE UP (ref 1–1.03)
UROBILINOGEN FLD QL: 0.2 MG/DL — SIGNIFICANT CHANGE UP (ref 0.2–1)
WBC # BLD: 4.54 K/UL — SIGNIFICANT CHANGE UP (ref 3.8–10.5)
WBC # FLD AUTO: 4.54 K/UL — SIGNIFICANT CHANGE UP (ref 3.8–10.5)

## 2025-08-29 PROCEDURE — 87086 URINE CULTURE/COLONY COUNT: CPT

## 2025-08-29 PROCEDURE — 93010 ELECTROCARDIOGRAM REPORT: CPT

## 2025-08-29 PROCEDURE — 86901 BLOOD TYPING SEROLOGIC RH(D): CPT

## 2025-08-29 PROCEDURE — 86850 RBC ANTIBODY SCREEN: CPT

## 2025-08-29 PROCEDURE — 86900 BLOOD TYPING SEROLOGIC ABO: CPT

## 2025-08-29 PROCEDURE — 85025 COMPLETE CBC W/AUTO DIFF WBC: CPT

## 2025-08-29 PROCEDURE — 83036 HEMOGLOBIN GLYCOSYLATED A1C: CPT

## 2025-08-29 PROCEDURE — 36415 COLL VENOUS BLD VENIPUNCTURE: CPT

## 2025-08-29 PROCEDURE — 93005 ELECTROCARDIOGRAM TRACING: CPT

## 2025-08-29 PROCEDURE — G0463: CPT

## 2025-08-29 PROCEDURE — 81003 URINALYSIS AUTO W/O SCOPE: CPT

## 2025-08-29 PROCEDURE — 80053 COMPREHEN METABOLIC PANEL: CPT

## 2025-08-30 LAB
CULTURE RESULTS: SIGNIFICANT CHANGE UP
SPECIMEN SOURCE: SIGNIFICANT CHANGE UP

## 2025-09-09 ENCOUNTER — APPOINTMENT (OUTPATIENT)
Dept: FAMILY MEDICINE | Facility: CLINIC | Age: 70
End: 2025-09-09
Payer: MEDICARE

## 2025-09-09 VITALS
WEIGHT: 144 LBS | HEART RATE: 80 BPM | SYSTOLIC BLOOD PRESSURE: 124 MMHG | OXYGEN SATURATION: 98 % | DIASTOLIC BLOOD PRESSURE: 78 MMHG | TEMPERATURE: 97.2 F | RESPIRATION RATE: 14 BRPM | BODY MASS INDEX: 28.27 KG/M2 | HEIGHT: 60 IN

## 2025-09-09 DIAGNOSIS — Z01.818 ENCOUNTER FOR OTHER PREPROCEDURAL EXAMINATION: ICD-10-CM

## 2025-09-09 DIAGNOSIS — N39.3 STRESS INCONTINENCE (FEMALE) (MALE): ICD-10-CM

## 2025-09-09 PROCEDURE — 99214 OFFICE O/P EST MOD 30 MIN: CPT

## 2025-09-09 PROCEDURE — G2211 COMPLEX E/M VISIT ADD ON: CPT

## 2025-09-11 ENCOUNTER — APPOINTMENT (OUTPATIENT)
Dept: UROGYNECOLOGY | Facility: CLINIC | Age: 70
End: 2025-09-11

## 2025-09-17 ENCOUNTER — TRANSCRIPTION ENCOUNTER (OUTPATIENT)
Age: 70
End: 2025-09-17

## 2025-09-17 ENCOUNTER — APPOINTMENT (OUTPATIENT)
Dept: UROGYNECOLOGY | Facility: HOSPITAL | Age: 70
End: 2025-09-17

## 2025-09-17 RX ORDER — ROSUVASTATIN CALCIUM 20 MG/1
1 TABLET, FILM COATED ORAL
Refills: 0 | DISCHARGE

## 2025-09-17 RX ORDER — TRASTUZUMAB-DKST 150 MG/7.4ML
1 INJECTION, POWDER, LYOPHILIZED, FOR SOLUTION INTRAVENOUS
Refills: 0 | DISCHARGE

## 2025-09-17 RX ORDER — PERTUZUMAB 30 MG/ML
1 INJECTION, SOLUTION, CONCENTRATE INTRAVENOUS
Refills: 0 | DISCHARGE